# Patient Record
Sex: MALE | Race: BLACK OR AFRICAN AMERICAN | Employment: FULL TIME | URBAN - METROPOLITAN AREA
[De-identification: names, ages, dates, MRNs, and addresses within clinical notes are randomized per-mention and may not be internally consistent; named-entity substitution may affect disease eponyms.]

---

## 2017-01-03 ENCOUNTER — OFFICE VISIT (OUTPATIENT)
Dept: INTERNAL MEDICINE CLINIC | Age: 57
End: 2017-01-03

## 2017-01-03 VITALS
SYSTOLIC BLOOD PRESSURE: 142 MMHG | WEIGHT: 281 LBS | HEIGHT: 67 IN | DIASTOLIC BLOOD PRESSURE: 92 MMHG | HEART RATE: 64 BPM | BODY MASS INDEX: 44.1 KG/M2

## 2017-01-03 DIAGNOSIS — E11.9 CONTROLLED TYPE 2 DIABETES MELLITUS WITHOUT COMPLICATION, WITHOUT LONG-TERM CURRENT USE OF INSULIN (HCC): ICD-10-CM

## 2017-01-03 DIAGNOSIS — Z23 NEED FOR PNEUMOCOCCAL VACCINATION: Primary | ICD-10-CM

## 2017-01-03 DIAGNOSIS — Z12.5 PROSTATE CANCER SCREENING: ICD-10-CM

## 2017-01-03 LAB
A/G RATIO: 1.5 (ref 1.1–2.2)
ALBUMIN SERPL-MCNC: 4.3 G/DL (ref 3.4–5)
ALP BLD-CCNC: 73 U/L (ref 40–129)
ALT SERPL-CCNC: 29 U/L (ref 10–40)
ANION GAP SERPL CALCULATED.3IONS-SCNC: 15 MMOL/L (ref 3–16)
AST SERPL-CCNC: 11 U/L (ref 15–37)
BASOPHILS ABSOLUTE: 0.1 K/UL (ref 0–0.2)
BASOPHILS RELATIVE PERCENT: 0.6 %
BILIRUB SERPL-MCNC: 0.3 MG/DL (ref 0–1)
BUN BLDV-MCNC: 22 MG/DL (ref 7–20)
CALCIUM SERPL-MCNC: 9.7 MG/DL (ref 8.3–10.6)
CHLORIDE BLD-SCNC: 98 MMOL/L (ref 99–110)
CO2: 25 MMOL/L (ref 21–32)
CREAT SERPL-MCNC: 1.1 MG/DL (ref 0.9–1.3)
EOSINOPHILS ABSOLUTE: 0 K/UL (ref 0–0.6)
EOSINOPHILS RELATIVE PERCENT: 0.3 %
GFR AFRICAN AMERICAN: >60
GFR NON-AFRICAN AMERICAN: >60
GLOBULIN: 2.9 G/DL
GLUCOSE BLD-MCNC: 238 MG/DL (ref 70–99)
HCT VFR BLD CALC: 42.3 % (ref 40.5–52.5)
HEMOGLOBIN: 13.8 G/DL (ref 13.5–17.5)
LYMPHOCYTES ABSOLUTE: 2.1 K/UL (ref 1–5.1)
LYMPHOCYTES RELATIVE PERCENT: 17.9 %
MCH RBC QN AUTO: 29.2 PG (ref 26–34)
MCHC RBC AUTO-ENTMCNC: 32.7 G/DL (ref 31–36)
MCV RBC AUTO: 89.4 FL (ref 80–100)
MONOCYTES ABSOLUTE: 1 K/UL (ref 0–1.3)
MONOCYTES RELATIVE PERCENT: 8 %
NEUTROPHILS ABSOLUTE: 8.7 K/UL (ref 1.7–7.7)
NEUTROPHILS RELATIVE PERCENT: 73.2 %
PDW BLD-RTO: 14.2 % (ref 12.4–15.4)
PLATELET # BLD: 245 K/UL (ref 135–450)
PMV BLD AUTO: 10 FL (ref 5–10.5)
POTASSIUM SERPL-SCNC: 4.6 MMOL/L (ref 3.5–5.1)
PROSTATE SPECIFIC ANTIGEN: 0.92 NG/ML (ref 0–4)
RBC # BLD: 4.73 M/UL (ref 4.2–5.9)
SODIUM BLD-SCNC: 138 MMOL/L (ref 136–145)
T4 FREE: 1 NG/DL (ref 0.9–1.8)
TOTAL PROTEIN: 7.2 G/DL (ref 6.4–8.2)
TSH SERPL DL<=0.05 MIU/L-ACNC: 1.34 UIU/ML (ref 0.27–4.2)
WBC # BLD: 11.9 K/UL (ref 4–11)

## 2017-01-03 PROCEDURE — 90732 PPSV23 VACC 2 YRS+ SUBQ/IM: CPT | Performed by: INTERNAL MEDICINE

## 2017-01-03 PROCEDURE — 90471 IMMUNIZATION ADMIN: CPT | Performed by: INTERNAL MEDICINE

## 2017-01-03 PROCEDURE — 99205 OFFICE O/P NEW HI 60 MIN: CPT | Performed by: INTERNAL MEDICINE

## 2017-01-03 RX ORDER — SITAGLIPTIN 100 MG/1
TABLET, FILM COATED ORAL
Refills: 5 | COMMUNITY
Start: 2016-12-08 | End: 2017-02-09 | Stop reason: SDUPTHER

## 2017-01-03 ASSESSMENT — ENCOUNTER SYMPTOMS
CONSTIPATION: 0
TROUBLE SWALLOWING: 0
SORE THROAT: 0
VOMITING: 0
BLOOD IN STOOL: 0
ABDOMINAL PAIN: 0
DIARRHEA: 0
CHEST TIGHTNESS: 0
ANAL BLEEDING: 0
RHINORRHEA: 0
SINUS PRESSURE: 0
COUGH: 0
WHEEZING: 0
STRIDOR: 0
EYE DISCHARGE: 0
SHORTNESS OF BREATH: 0
NAUSEA: 0
VOICE CHANGE: 0
CHOKING: 0

## 2017-01-04 LAB
ESTIMATED AVERAGE GLUCOSE: 182.9 MG/DL
HBA1C MFR BLD: 8 %

## 2017-02-09 RX ORDER — SITAGLIPTIN 100 MG/1
TABLET, FILM COATED ORAL
Qty: 30 TABLET | Refills: 5 | Status: SHIPPED | OUTPATIENT
Start: 2017-02-09 | End: 2017-02-09 | Stop reason: SDUPTHER

## 2017-04-04 ENCOUNTER — OFFICE VISIT (OUTPATIENT)
Dept: INTERNAL MEDICINE CLINIC | Age: 57
End: 2017-04-04

## 2017-04-04 VITALS
SYSTOLIC BLOOD PRESSURE: 138 MMHG | HEART RATE: 64 BPM | HEIGHT: 67 IN | BODY MASS INDEX: 44.26 KG/M2 | DIASTOLIC BLOOD PRESSURE: 84 MMHG | WEIGHT: 282 LBS

## 2017-04-04 DIAGNOSIS — G47.33 OBSTRUCTIVE SLEEP APNEA SYNDROME: Chronic | ICD-10-CM

## 2017-04-04 DIAGNOSIS — E66.01 MORBID OBESITY DUE TO EXCESS CALORIES (HCC): Chronic | ICD-10-CM

## 2017-04-04 DIAGNOSIS — I10 DIABETES MELLITUS WITH COINCIDENT HYPERTENSION (HCC): Chronic | ICD-10-CM

## 2017-04-04 DIAGNOSIS — Z00.00 ROUTINE ADULT HEALTH MAINTENANCE: ICD-10-CM

## 2017-04-04 DIAGNOSIS — Z00.00 ROUTINE ADULT HEALTH MAINTENANCE: Primary | ICD-10-CM

## 2017-04-04 DIAGNOSIS — E11.9 DIABETES MELLITUS WITH COINCIDENT HYPERTENSION (HCC): Chronic | ICD-10-CM

## 2017-04-04 PROCEDURE — 99214 OFFICE O/P EST MOD 30 MIN: CPT | Performed by: INTERNAL MEDICINE

## 2017-04-04 RX ORDER — ATORVASTATIN CALCIUM 20 MG/1
20 TABLET, FILM COATED ORAL DAILY
Qty: 30 TABLET | Refills: 2 | Status: SHIPPED | OUTPATIENT
Start: 2017-04-04 | End: 2017-04-04 | Stop reason: SDUPTHER

## 2017-04-05 LAB
CHOLESTEROL, TOTAL: 149 MG/DL (ref 0–199)
HDLC SERPL-MCNC: 50 MG/DL (ref 40–60)
HEPATITIS C ANTIBODY INTERPRETATION: NORMAL
HIV-1 AND HIV-2 ANTIBODIES: NORMAL
LDL CHOLESTEROL CALCULATED: 65 MG/DL
TRIGL SERPL-MCNC: 169 MG/DL (ref 0–150)
VLDLC SERPL CALC-MCNC: 34 MG/DL

## 2017-06-15 ENCOUNTER — TELEPHONE (OUTPATIENT)
Dept: SLEEP MEDICINE | Age: 57
End: 2017-06-15

## 2017-07-06 ENCOUNTER — OFFICE VISIT (OUTPATIENT)
Dept: INTERNAL MEDICINE CLINIC | Age: 57
End: 2017-07-06

## 2017-07-06 VITALS
WEIGHT: 273 LBS | HEART RATE: 76 BPM | BODY MASS INDEX: 42.85 KG/M2 | SYSTOLIC BLOOD PRESSURE: 138 MMHG | DIASTOLIC BLOOD PRESSURE: 86 MMHG | HEIGHT: 67 IN

## 2017-07-06 DIAGNOSIS — I10 DIABETES MELLITUS WITH COINCIDENT HYPERTENSION (HCC): Primary | Chronic | ICD-10-CM

## 2017-07-06 DIAGNOSIS — E11.9 DIABETES MELLITUS WITH COINCIDENT HYPERTENSION (HCC): Primary | Chronic | ICD-10-CM

## 2017-07-06 DIAGNOSIS — G47.33 OBSTRUCTIVE SLEEP APNEA SYNDROME: Chronic | ICD-10-CM

## 2017-07-06 DIAGNOSIS — E66.01 MORBID OBESITY DUE TO EXCESS CALORIES (HCC): Chronic | ICD-10-CM

## 2017-07-06 DIAGNOSIS — B35.1 ONYCHOMYCOSIS: ICD-10-CM

## 2017-07-06 LAB
CREATININE URINE: 244.7 MG/DL (ref 39–259)
MICROALBUMIN UR-MCNC: 22.4 MG/DL
MICROALBUMIN/CREAT UR-RTO: 91.5 MG/G (ref 0–30)

## 2017-07-06 PROCEDURE — 99214 OFFICE O/P EST MOD 30 MIN: CPT | Performed by: INTERNAL MEDICINE

## 2017-07-06 RX ORDER — TERBINAFINE HYDROCHLORIDE 250 MG/1
250 TABLET ORAL DAILY
Qty: 30 TABLET | Refills: 5 | Status: SHIPPED | OUTPATIENT
Start: 2017-07-06 | End: 2017-12-20 | Stop reason: SDUPTHER

## 2017-07-07 LAB
ESTIMATED AVERAGE GLUCOSE: 171.4 MG/DL
HBA1C MFR BLD: 7.6 %

## 2017-09-12 ENCOUNTER — OFFICE VISIT (OUTPATIENT)
Dept: ORTHOPEDIC SURGERY | Age: 57
End: 2017-09-12

## 2017-09-12 VITALS — RESPIRATION RATE: 16 BRPM | HEIGHT: 67 IN | BODY MASS INDEX: 41.59 KG/M2 | WEIGHT: 265 LBS

## 2017-09-12 DIAGNOSIS — M65.311 TRIGGER FINGER OF RIGHT THUMB: Primary | ICD-10-CM

## 2017-09-12 PROCEDURE — 20550 NJX 1 TENDON SHEATH/LIGAMENT: CPT | Performed by: ORTHOPAEDIC SURGERY

## 2017-09-12 PROCEDURE — 99213 OFFICE O/P EST LOW 20 MIN: CPT | Performed by: ORTHOPAEDIC SURGERY

## 2017-12-05 ENCOUNTER — OFFICE VISIT (OUTPATIENT)
Dept: ORTHOPEDIC SURGERY | Age: 57
End: 2017-12-05

## 2017-12-05 VITALS — RESPIRATION RATE: 16 BRPM | BODY MASS INDEX: 42.38 KG/M2 | HEIGHT: 67 IN | WEIGHT: 270 LBS

## 2017-12-05 DIAGNOSIS — M79.645 FINGER PAIN, LEFT: ICD-10-CM

## 2017-12-05 DIAGNOSIS — M65.341 TRIGGER RING FINGER OF RIGHT HAND: Primary | ICD-10-CM

## 2017-12-05 PROCEDURE — 73140 X-RAY EXAM OF FINGER(S): CPT | Performed by: ORTHOPAEDIC SURGERY

## 2017-12-05 PROCEDURE — 99213 OFFICE O/P EST LOW 20 MIN: CPT | Performed by: ORTHOPAEDIC SURGERY

## 2017-12-05 NOTE — PROGRESS NOTES
scheduled and the patient is asked to call me if there are any additional questions. The patient understands that the surgery will be done by Dr. Pierce Wong. The patient is instructed to stop taking aspirin 7-10 days before surgery. They will check with their medical doctor(s), if needed, to ask permission to do so.

## 2017-12-05 NOTE — LETTER
CONSENT TO OPERATION  AND/OR OTHER PROCEDURE(S)          PATIENT : Jaylene Quezada   YOB: 1960      DATE : 12/5/17          1. I request and consent that Dr. Nora Limon,  and/or his associates or assistants perform an operation and/or procedures on the above patient at  [place] [address], to treat the condition(s) which appear indicated by the diagnostic studies already performed. I have been told that in general terms the nature, purpose and reasonable expectations of the operation and/or procedure(s) are:     Release Right Ring Trigger Finger      2. It has been explained to me by the informing physician that during the course of the operation and/or procedure(s) unforeseen conditions may be revealed that necessitate an extension of the original operation and/or procedure(s) or different operation and/or procedures than those set forth in Paragraph 1. I therefore authorize and request that my physician and/or his associates or assistants perform such operations and/or procedures as are necessary and desirable in the exercise of professional judgment. The authority granted under this Paragraph 2 shall extend to all conditions that require treatment and are known to my physician at the time the operation is commenced. 3. I have been made aware by the informing physician of certain risks and consequences that are associated with the operation and/or procedure(s) described in Paragraph 1, the reasonable alternative methods or treatment, the possible consequences, the possibility that the operation and/or procedure(s) may be unsuccessful and the possibility of complications. I understand the reasonably known risks to be:      ? Bleeding  ? Infection  ? Poor Healing  ? Possible Damage to Nerve, Vessel, Tendon/Muscle or Bone  ? Need for further Treatment/Surgery  ? Stiffness  ? Pain  ? Residual or Recurrent Symptoms  ? Anesthetic and/or Medical Risks  ?

## 2017-12-11 ENCOUNTER — TELEPHONE (OUTPATIENT)
Dept: INTERNAL MEDICINE CLINIC | Age: 57
End: 2017-12-11

## 2017-12-11 NOTE — TELEPHONE ENCOUNTER
Pt calling having hand surgery 12/26 ---where can we work him in for a preop? Please let me know. Thanks.

## 2017-12-19 ENCOUNTER — OFFICE VISIT (OUTPATIENT)
Dept: INTERNAL MEDICINE CLINIC | Age: 57
End: 2017-12-19

## 2017-12-19 VITALS
HEIGHT: 67 IN | SYSTOLIC BLOOD PRESSURE: 138 MMHG | DIASTOLIC BLOOD PRESSURE: 86 MMHG | WEIGHT: 269.2 LBS | BODY MASS INDEX: 42.25 KG/M2 | HEART RATE: 72 BPM

## 2017-12-19 DIAGNOSIS — I10 DIABETES MELLITUS WITH COINCIDENT HYPERTENSION (HCC): Chronic | ICD-10-CM

## 2017-12-19 DIAGNOSIS — E66.01 MORBID OBESITY DUE TO EXCESS CALORIES (HCC): Chronic | ICD-10-CM

## 2017-12-19 DIAGNOSIS — M65.341 TRIGGER RING FINGER OF RIGHT HAND: ICD-10-CM

## 2017-12-19 DIAGNOSIS — M65.311 TRIGGER FINGER OF RIGHT THUMB: ICD-10-CM

## 2017-12-19 DIAGNOSIS — E11.9 CONTROLLED TYPE 2 DIABETES MELLITUS WITHOUT COMPLICATION, WITHOUT LONG-TERM CURRENT USE OF INSULIN (HCC): ICD-10-CM

## 2017-12-19 DIAGNOSIS — G47.33 OBSTRUCTIVE SLEEP APNEA SYNDROME: Chronic | ICD-10-CM

## 2017-12-19 DIAGNOSIS — Z01.818 PRE-OP EXAMINATION: Primary | ICD-10-CM

## 2017-12-19 DIAGNOSIS — E11.9 DIABETES MELLITUS WITH COINCIDENT HYPERTENSION (HCC): Chronic | ICD-10-CM

## 2017-12-19 PROCEDURE — 99214 OFFICE O/P EST MOD 30 MIN: CPT | Performed by: INTERNAL MEDICINE

## 2017-12-19 NOTE — PROGRESS NOTES
Ezra Cerna is a 59 y.o.  male who comes for a preoperative exam.  He  is referred by Dr. Bert Garcia for perioperative risk determination for upcoming surgery for right 4th finger trigger finger. Past Medical History:   Diagnosis Date    Allergic rhinitis     Carpal tunnel syndrome 12/24/2014    Cervical disc disease     CADY    Colon polyps 2016    Diabetes mellitus ([address]     Dysfunction of eustachian tube 2/27/2012    Hypertension     Morbid obesity due to excess calories ([address] 4/4/2017    Simple chronic serous otitis media 2/27/2012    Trigger finger, acquired 6/11/2014    Uncontrolled type 2 diabetes mellitus without complication, without long-term current use of insulin ([address] 4/4/2017     Past Surgical History:   Procedure Laterality Date    KNEE SURGERY      NASAL SEPTUM SURGERY  2005    TYMPANOSTOMY TUBE PLACEMENT       No family history on file. Social History     Social History    Marital status:      Spouse name: N/A    Number of children: N/A    Years of education: N/A     Occupational History    Not on file.      Social History Main Topics    Smoking status: Never Smoker    Smokeless tobacco: Never Used    Alcohol use No    Drug use: No    Sexual activity: Not on file     Other Topics Concern    Not on file     Social History Narrative    No narrative on file       Current Outpatient Prescriptions:     amLODIPine (NORVASC) 5 MG tablet, TAKE 1 TABLET BY MOUTH EVERY DAY, Disp: 90 tablet, Rfl: 1    losartan (COZAAR) 100 MG tablet, TAKE 1 TABLET BY MOUTH DAILY, Disp: 90 tablet, Rfl: 1    TRULICITY 4.29 WT/1.7XM SOPN, INJECT 0.75 MG INTO THE SKIN ONCE A WEEK, Disp: 4 Pen, Rfl: 3    ONETOUCH DELICA LANCETS 27T MISC, 1 Device by Other route 3 times daily, Disp: 100 each, Rfl: 5    terbinafine (LAMISIL) 250 MG tablet, Take 1 tablet by mouth daily, Disp: 30 tablet, Rfl: 5    atorvastatin (LIPITOR) 20 MG tablet, TAKE 1 TABLET BY MOUTH DAILY, Disp: 90 tablet, Rfl: 1   glucose blood VI test strips (ONE TOUCH ULTRA TEST) strip, 1 each by In Vitro route daily As needed. , Disp: 100 each, Rfl: 3    metFORMIN (GLUCOPHAGE) 1000 MG tablet, Take 1 tablet by mouth 2 times daily (with meals), Disp: 180 tablet, Rfl: 3    aspirin 81 MG EC tablet, Take 81 mg by mouth daily. , Disp: , Rfl:   Allergies   Allergen Reactions    Pcn [Penicillins]      ROS  Physical Exam  Assessment/Plan:  Carmella Michael was seen today for pre-op exam.    Diagnoses and all orders for this visit:    Pre-op examination    Obstructive sleep apnea syndrome    Trigger ring finger of right hand    Trigger finger of right thumb    Morbid obesity due to excess calories ([address]    Diabetes mellitus with coincident hypertension ([address]    Controlled type 2 diabetes mellitus without complication, without long-term current use of insulin ([address]    Perioperative risk related to the patient's upcoming surgery is considered low. His risk is primarily related to the patient's Morbid obesity, as obstructive sleep apnea. He is cleared for surgery. Pre-op exam was completed on December 19, 2017 at 1000.     Lizeth Small MD

## 2017-12-20 ENCOUNTER — PAT TELEPHONE (OUTPATIENT)
Dept: PREADMISSION TESTING | Age: 57
End: 2017-12-20

## 2017-12-20 VITALS — BODY MASS INDEX: 42.22 KG/M2 | WEIGHT: 269 LBS | HEIGHT: 67 IN

## 2017-12-20 RX ORDER — TERBINAFINE HYDROCHLORIDE 250 MG/1
250 TABLET ORAL DAILY
COMMUNITY
End: 2018-07-13 | Stop reason: ALTCHOICE

## 2017-12-20 NOTE — PRE-PROCEDURE INSTRUCTIONS
[facility] time_1015___________        Surgery time_1145___________    Take the following medications with a sip of water:  Losartan, Amlodipine    Do not eat or drink anything after 12:00 midnight prior to your surgery. This includes water chewing gum, mints and ice chips. You may brush your teeth and gargle the morning of your surgery, but do not swallow the water     Please see your family doctor/pediatrician for a history and physical and/or concerning medications. Bring any test results/reports from your physicians office. If you are under the care of a heart doctor or specialist doctor, please be aware that you may be asked to them for clearance    You may be asked to stop blood thinners such as Coumadin, Plavix, Fragmin, Lovenox, etc., or any anti-inflammatories such as:  Aspirin, Ibuprofen, Advil, Naproxen prior to your surgery. We also ask that you stop any OTC medications such as fish oil, vitamin E, glucosamine, garlic, Multivitamins, COQ 10, etc.    We ask that you do not smoke 24 hours prior to surgery  We ask that you do not  drink any alcoholic beverages 24 hours prior to surgery     You must make arrangements for a responsible adult to take you home after your surgery. For your safety you will not be allowed to leave alone or drive yourself home. Your surgery will be cancelled if you do not have a ride home. Also for your safety, it is strongly suggested that someone stay with you the first 24 hours after your surgery. A parent or legal guardian must accompany a child scheduled for surgery and plan to stay at the hospital until the child is discharged. Please do not bring other children with you. For your comfort, please wear simple loose fitting clothing to the hospital.  Please do not bring valuables.     Do not wear any make-up or nail polish on your fingers or toes      For your safety, please do not wear any jewelry or

## 2017-12-20 NOTE — PROGRESS NOTES
C-Difficile admission screening and protocol:     * Admitted with diarrhea? NO     *Prior history of C-Diff. In last 3 months? NO     *Antibiotic use in the past 6-8 weeks? NO     *Prior hospitalization or nursing home in the last month?   NO

## 2017-12-21 DIAGNOSIS — I10 DIABETES MELLITUS WITH COINCIDENT HYPERTENSION (HCC): Chronic | ICD-10-CM

## 2017-12-21 DIAGNOSIS — E11.9 DIABETES MELLITUS WITH COINCIDENT HYPERTENSION (HCC): Chronic | ICD-10-CM

## 2017-12-21 RX ORDER — ATORVASTATIN CALCIUM 20 MG/1
TABLET, FILM COATED ORAL
Qty: 90 TABLET | Refills: 0 | Status: SHIPPED | OUTPATIENT
Start: 2017-12-21 | End: 2018-03-18 | Stop reason: SDUPTHER

## 2017-12-26 ENCOUNTER — HOSPITAL ENCOUNTER (OUTPATIENT)
Dept: SURGERY | Age: 57
Discharge: HOME | End: 2017-12-26
Attending: ORTHOPAEDIC SURGERY | Admitting: ORTHOPAEDIC SURGERY

## 2017-12-26 VITALS
DIASTOLIC BLOOD PRESSURE: 106 MMHG | HEART RATE: 67 BPM | BODY MASS INDEX: 42.72 KG/M2 | WEIGHT: 272.16 LBS | SYSTOLIC BLOOD PRESSURE: 179 MMHG | TEMPERATURE: 97.5 F | HEIGHT: 67 IN | OXYGEN SATURATION: 98 % | RESPIRATION RATE: 20 BRPM

## 2017-12-26 LAB
GLUCOSE BLD-MCNC: 111 MG/DL (ref 70–99)
GLUCOSE BLD-MCNC: 98 MG/DL (ref 70–99)
PERFORMED ON: ABNORMAL
PERFORMED ON: NORMAL

## 2017-12-26 RX ORDER — FENTANYL CITRATE 50 UG/ML
25 INJECTION, SOLUTION INTRAMUSCULAR; INTRAVENOUS EVERY 5 MIN PRN
Status: DISCONTINUED | OUTPATIENT
Start: 2017-12-26 | End: 2017-12-27 | Stop reason: HOSPADM

## 2017-12-26 RX ORDER — ONDANSETRON 2 MG/ML
4 INJECTION INTRAMUSCULAR; INTRAVENOUS
Status: ACTIVE | OUTPATIENT
Start: 2017-12-26 | End: 2017-12-26

## 2017-12-26 RX ORDER — SODIUM CHLORIDE 0.9 % (FLUSH) 0.9 %
10 SYRINGE (ML) INJECTION PRN
Status: DISCONTINUED | OUTPATIENT
Start: 2017-12-26 | End: 2017-12-27 | Stop reason: HOSPADM

## 2017-12-26 RX ORDER — HYDROMORPHONE HCL 110MG/55ML
0.5 PATIENT CONTROLLED ANALGESIA SYRINGE INTRAVENOUS EVERY 5 MIN PRN
Status: DISCONTINUED | OUTPATIENT
Start: 2017-12-26 | End: 2017-12-27 | Stop reason: HOSPADM

## 2017-12-26 RX ORDER — OXYCODONE HYDROCHLORIDE AND ACETAMINOPHEN 5; 325 MG/1; MG/1
1 TABLET ORAL PRN
Status: ACTIVE | OUTPATIENT
Start: 2017-12-26 | End: 2017-12-26

## 2017-12-26 RX ORDER — OXYCODONE HYDROCHLORIDE AND ACETAMINOPHEN 5; 325 MG/1; MG/1
2 TABLET ORAL PRN
Status: ACTIVE | OUTPATIENT
Start: 2017-12-26 | End: 2017-12-26

## 2017-12-26 RX ORDER — SODIUM CHLORIDE 0.9 % (FLUSH) 0.9 %
10 SYRINGE (ML) INJECTION EVERY 12 HOURS SCHEDULED
Status: DISCONTINUED | OUTPATIENT
Start: 2017-12-26 | End: 2017-12-27 | Stop reason: HOSPADM

## 2017-12-26 RX ORDER — SODIUM CHLORIDE 9 MG/ML
INJECTION, SOLUTION INTRAVENOUS CONTINUOUS
Status: DISCONTINUED | OUTPATIENT
Start: 2017-12-26 | End: 2017-12-27 | Stop reason: HOSPADM

## 2017-12-26 RX ADMIN — SODIUM CHLORIDE: 9 INJECTION, SOLUTION INTRAVENOUS at 10:49

## 2017-12-26 ASSESSMENT — ENCOUNTER SYMPTOMS: SHORTNESS OF BREATH: 0

## 2017-12-26 ASSESSMENT — PAIN - FUNCTIONAL ASSESSMENT: PAIN_FUNCTIONAL_ASSESSMENT: 0-10

## 2017-12-26 ASSESSMENT — PAIN SCALES - GENERAL: PAINLEVEL_OUTOF10: 2

## 2017-12-26 NOTE — ANESTHESIA PRE-OP
5 MG tablet TAKE 1 TABLET BY MOUTH EVERY DAY 90 tablet 1    losartan (COZAAR) 100 MG tablet TAKE 1 TABLET BY MOUTH DAILY 90 tablet 1    ONETOUCH DELICA LANCETS 84R MISC 1 Device by Other route 3 times daily 100 each 5    glucose blood VI test strips (ONE TOUCH ULTRA TEST) strip 1 each by In Vitro route daily As needed. 100 each 3    metFORMIN (GLUCOPHAGE) 1000 MG tablet Take 1 tablet by mouth 2 times daily (with meals) 180 tablet 3    aspirin 81 MG EC tablet Take 81 mg by mouth daily. No current facility-administered medications on file prior to encounter. Current Outpatient Prescriptions   Medication Sig Dispense Refill    atorvastatin (LIPITOR) 20 MG tablet TAKE 1 TABLET BY MOUTH DAILY 90 tablet 0    TRULICITY 4.53 WQ/9.9UP SOPN INJECT 0.75MG INTO THE SKIN ONCE A WEEK 2 mL 0    terbinafine (LAMISIL) 250 MG tablet Take 250 mg by mouth daily      amLODIPine (NORVASC) 5 MG tablet TAKE 1 TABLET BY MOUTH EVERY DAY 90 tablet 1    losartan (COZAAR) 100 MG tablet TAKE 1 TABLET BY MOUTH DAILY 90 tablet 1    ONETOUCH DELICA LANCETS 42J MISC 1 Device by Other route 3 times daily 100 each 5    glucose blood VI test strips (ONE TOUCH ULTRA TEST) strip 1 each by In Vitro route daily As needed. 100 each 3    metFORMIN (GLUCOPHAGE) 1000 MG tablet Take 1 tablet by mouth 2 times daily (with meals) 180 tablet 3    aspirin 81 MG EC tablet Take 81 mg by mouth daily. Current Facility-Administered Medications   Medication Dose Route Frequency Provider Last Rate Last Dose    0.9 % sodium chloride infusion   Intravenous Continuous Christie Torrez MD        sodium chloride flush 0.9 % injection 10 mL  10 mL Intravenous 2 times per day Christie Torrez MD        sodium chloride flush 0.9 % injection 10 mL  10 mL Intravenous PRN Christie Torrez MD        famotidine (PEPCID) injection 20 mg  20 mg Intravenous Once Christie Torrez MD         Vital Signs (Current) There were no vitals filed for this visit.   Vital

## 2017-12-26 NOTE — PROGRESS NOTES
Pt awake. States \"discomfort\" in hand 2/10. Moves fingers to right hand well.   To phase 2 care per stretcher with RN assist.

## 2017-12-26 NOTE — PROGRESS NOTES
To pacu from OR. PT asleep with oral airway in place. Dressing to right hand dry and intact. Fingers to right hand warm with brisk cap refill. IV infusing. Monitor in sinus rhythm.

## 2017-12-26 NOTE — H&P
Pre-operative Update of H&P:    I  have seen & examined Mr. Kala Crow related solely to his hand and upper extremity conditions, prior to the scheduled procedure on the date of his surgery. The indications for the planned surgical procedure & and his upper-extremity conditionare unchanged. Please see the Anesthesia Pre-Op Note from date of surgery for Mr. Roxanna Ranger Sanon's systemic evaluation.

## 2018-01-05 ENCOUNTER — OFFICE VISIT (OUTPATIENT)
Dept: ORTHOPEDIC SURGERY | Age: 58
End: 2018-01-05

## 2018-01-05 VITALS — HEIGHT: 67 IN | BODY MASS INDEX: 42.38 KG/M2 | RESPIRATION RATE: 17 BRPM | WEIGHT: 270 LBS

## 2018-01-05 DIAGNOSIS — M65.341 TRIGGER RING FINGER OF RIGHT HAND: Primary | ICD-10-CM

## 2018-01-05 PROCEDURE — 99024 POSTOP FOLLOW-UP VISIT: CPT | Performed by: PHYSICIAN ASSISTANT

## 2018-01-05 NOTE — PATIENT INSTRUCTIONS
Postoperative Instructions After Trigger Finger Release    Dr. Morgan Valderrama        1. After bandages are removed one week from surgery, you may chose to wear a small bandage over the incision if you wish, though you do not need to. 2. Keep incision dry until sutures are removed or it has been 14 days since your surgery. Thereafter, you may wash with mild soap and water and shower normally. 3. IF YOU HAVE DISSOLVABLE SUTURES:  Once your stiches have fully disappeared, you should begin gently massaging the incision with Vitamin E (may use Vitamin E lotion or contents of Vitamin E capsule). IF YOU HAVE NON-DISSOLVABLE SUTURES (Black plastic): Keep stitches dry. Do Not apply any ointment or lotion to incision site. Schedule appointment for 14 or more days after the date of your surgery for suture removal visit. After your stitches are removed, you should begin gently massaging the incision with Vitamin E (may use Vitamin E lotion or contents of Vitamin E capsule). 4. Work hard on motion of the fingers and wrist, straightening each finger fully and bending each finger fully, bending wrist forward and bending wrist backwards. Do not be concerned if you experience discomfort. This will not damage the surgery. 5. You may begin using the hand as it feels comfortable beginning 12-14 days from the day of surgery. You may not feel entirely comfortable gripping or lifting heavy objects for several weeks. 6. You may expect to see some skin peel off around the incision. You may be left with a small area of pink baby skin. This is quite normal.    Thank you for choosing [address] [place] [address] Physicians for your Hand and Upper Extremity needs. If we can be of any further assistance to you, please do not hesitate to contact us. Office Phone Number:  [phone]  or  [phone]     Thank you for choosing [address] [place] [address] Physicians for your Hand and Upper Extremity needs.   If we can be of any further assistance to

## 2018-01-11 DIAGNOSIS — I10 DIABETES MELLITUS WITH COINCIDENT HYPERTENSION (HCC): Chronic | ICD-10-CM

## 2018-01-11 DIAGNOSIS — E11.9 DIABETES MELLITUS WITH COINCIDENT HYPERTENSION (HCC): Chronic | ICD-10-CM

## 2018-01-11 DIAGNOSIS — E66.01 MORBID OBESITY DUE TO EXCESS CALORIES (HCC): Chronic | ICD-10-CM

## 2018-01-11 RX ORDER — DULAGLUTIDE 0.75 MG/.5ML
INJECTION, SOLUTION SUBCUTANEOUS
Qty: 2 ML | Refills: 0 | Status: SHIPPED | OUTPATIENT
Start: 2018-01-11 | End: 2018-02-15 | Stop reason: SDUPTHER

## 2018-01-12 ENCOUNTER — OFFICE VISIT (OUTPATIENT)
Dept: INTERNAL MEDICINE CLINIC | Age: 58
End: 2018-01-12

## 2018-01-12 VITALS
DIASTOLIC BLOOD PRESSURE: 86 MMHG | HEIGHT: 67 IN | SYSTOLIC BLOOD PRESSURE: 136 MMHG | HEART RATE: 76 BPM | WEIGHT: 268 LBS | BODY MASS INDEX: 42.06 KG/M2

## 2018-01-12 DIAGNOSIS — E11.9 CONTROLLED TYPE 2 DIABETES MELLITUS WITHOUT COMPLICATION, WITHOUT LONG-TERM CURRENT USE OF INSULIN (HCC): ICD-10-CM

## 2018-01-12 DIAGNOSIS — I10 DIABETES MELLITUS WITH COINCIDENT HYPERTENSION (HCC): Primary | Chronic | ICD-10-CM

## 2018-01-12 DIAGNOSIS — E11.9 DIABETES MELLITUS WITH COINCIDENT HYPERTENSION (HCC): Primary | Chronic | ICD-10-CM

## 2018-01-12 DIAGNOSIS — E66.01 MORBID OBESITY DUE TO EXCESS CALORIES (HCC): Chronic | ICD-10-CM

## 2018-01-12 DIAGNOSIS — L20.82 FLEXURAL ECZEMA: ICD-10-CM

## 2018-01-12 PROBLEM — M65.341 TRIGGER RING FINGER OF RIGHT HAND: Status: RESOLVED | Noted: 2017-12-05 | Resolved: 2018-01-12

## 2018-01-12 PROBLEM — M65.311 TRIGGER FINGER OF RIGHT THUMB: Status: RESOLVED | Noted: 2017-09-12 | Resolved: 2018-01-12

## 2018-01-12 LAB
A/G RATIO: 1.9 (ref 1.1–2.2)
ALBUMIN SERPL-MCNC: 4.7 G/DL (ref 3.4–5)
ALP BLD-CCNC: 79 U/L (ref 40–129)
ALT SERPL-CCNC: 22 U/L (ref 10–40)
ANION GAP SERPL CALCULATED.3IONS-SCNC: 15 MMOL/L (ref 3–16)
AST SERPL-CCNC: 14 U/L (ref 15–37)
BASOPHILS ABSOLUTE: 0.1 K/UL (ref 0–0.2)
BASOPHILS RELATIVE PERCENT: 1 %
BILIRUB SERPL-MCNC: 0.4 MG/DL (ref 0–1)
BUN BLDV-MCNC: 15 MG/DL (ref 7–20)
CALCIUM SERPL-MCNC: 9.9 MG/DL (ref 8.3–10.6)
CHLORIDE BLD-SCNC: 104 MMOL/L (ref 99–110)
CO2: 25 MMOL/L (ref 21–32)
CREAT SERPL-MCNC: 1.1 MG/DL (ref 0.9–1.3)
EOSINOPHILS ABSOLUTE: 0.3 K/UL (ref 0–0.6)
EOSINOPHILS RELATIVE PERCENT: 4.3 %
GFR AFRICAN AMERICAN: >60
GFR NON-AFRICAN AMERICAN: >60
GLOBULIN: 2.5 G/DL
GLUCOSE BLD-MCNC: 112 MG/DL (ref 70–99)
HCT VFR BLD CALC: 41.2 % (ref 40.5–52.5)
HEMOGLOBIN: 13.6 G/DL (ref 13.5–17.5)
LYMPHOCYTES ABSOLUTE: 2.2 K/UL (ref 1–5.1)
LYMPHOCYTES RELATIVE PERCENT: 34.1 %
MCH RBC QN AUTO: 30.3 PG (ref 26–34)
MCHC RBC AUTO-ENTMCNC: 33.1 G/DL (ref 31–36)
MCV RBC AUTO: 91.5 FL (ref 80–100)
MONOCYTES ABSOLUTE: 0.6 K/UL (ref 0–1.3)
MONOCYTES RELATIVE PERCENT: 8.6 %
NEUTROPHILS ABSOLUTE: 3.4 K/UL (ref 1.7–7.7)
NEUTROPHILS RELATIVE PERCENT: 52 %
PDW BLD-RTO: 13.9 % (ref 12.4–15.4)
PLATELET # BLD: 230 K/UL (ref 135–450)
PMV BLD AUTO: 9.7 FL (ref 5–10.5)
POTASSIUM SERPL-SCNC: 4.3 MMOL/L (ref 3.5–5.1)
RBC # BLD: 4.5 M/UL (ref 4.2–5.9)
SODIUM BLD-SCNC: 144 MMOL/L (ref 136–145)
T4 FREE: 1.2 NG/DL (ref 0.9–1.8)
TOTAL PROTEIN: 7.2 G/DL (ref 6.4–8.2)
TSH SERPL DL<=0.05 MIU/L-ACNC: 2.65 UIU/ML (ref 0.27–4.2)
WBC # BLD: 6.5 K/UL (ref 4–11)

## 2018-01-12 PROCEDURE — 99214 OFFICE O/P EST MOD 30 MIN: CPT | Performed by: INTERNAL MEDICINE

## 2018-01-12 RX ORDER — TRIAMCINOLONE ACETONIDE 1 MG/G
CREAM TOPICAL
Qty: 453 G | Refills: 1 | Status: SHIPPED | OUTPATIENT
Start: 2018-01-12

## 2018-01-12 ASSESSMENT — PATIENT HEALTH QUESTIONNAIRE - PHQ9
2. FEELING DOWN, DEPRESSED OR HOPELESS: 0
1. LITTLE INTEREST OR PLEASURE IN DOING THINGS: 0
SUM OF ALL RESPONSES TO PHQ9 QUESTIONS 1 & 2: 0
SUM OF ALL RESPONSES TO PHQ QUESTIONS 1-9: 0

## 2018-01-12 NOTE — PROGRESS NOTES
SUBJECTIVE:  Patient ID: Petrona Mattson is a 59 y.o. y.o. male     HPI    Petrona Mattson returns for follow up of hypertension. Patient has been taking His medications as prescribed. Patient's blood pressure is  controlled. Side effects related to taking the medications include no medication side effects noted    Patient returns to the office for follow up of his diabetes. His last hemoglobin A1c was 6.8. He is compliant with his medications but admits to a lot of dietary noncompliance. Patient has no symptoms related to his condition such as blurred vision, slurred speech, chest pain or shortness of breath. PHQ Scores 1/12/2018   PHQ2 Score 0   PHQ9 Score 0     Interpretation of Total Score Depression Severity: 1-4 = Minimal depression, 5-9 = Mild depression, 10-14 = Moderate depression, 15-19 = Moderately severe depression, 20-27 = Severe depression    Review of Systems    OBJECTIVE:    /86   Pulse 76   Ht 5' 7\" (1.702 m)   Wt 268 lb (121.6 kg)   BMI 41.97 kg/m²    Physical Exam   Constitutional: He is oriented to person, place, and time. He appears well-developed and well-nourished. No distress. HENT:   Head: Normocephalic and atraumatic. Pulmonary/Chest: Effort normal. No respiratory distress. Neurological: He is alert and oriented to person, place, and time. No cranial nerve deficit. Skin: Skin is warm and dry. He is not diaphoretic. Psychiatric: He has a normal mood and affect. His behavior is normal. Judgment and thought content normal.   Vitals reviewed.        Current Outpatient Prescriptions:     TRULICITY 8.76 TS/8.2RW SOPN, INJECT 0.75MG INTO THE SKIN ONCE A WEEK, Disp: 2 mL, Rfl: 0    atorvastatin (LIPITOR) 20 MG tablet, TAKE 1 TABLET BY MOUTH DAILY, Disp: 90 tablet, Rfl: 0    terbinafine (LAMISIL) 250 MG tablet, Take 250 mg by mouth daily, Disp: , Rfl:     amLODIPine (NORVASC) 5 MG tablet, TAKE 1 TABLET BY MOUTH EVERY DAY, Disp: 90 tablet, Rfl: 1    losartan (COZAAR) 100

## 2018-01-13 LAB
ESTIMATED AVERAGE GLUCOSE: 162.8 MG/DL
HBA1C MFR BLD: 7.3 %

## 2018-01-29 ENCOUNTER — TELEPHONE (OUTPATIENT)
Dept: ORTHOPEDIC SURGERY | Age: 58
End: 2018-01-29

## 2018-01-29 NOTE — TELEPHONE ENCOUNTER
Patient had sx on right ring finger 12/26/17    He states that he the finger and hand is still very swollen, is this normal?    Also there is a hardness where the wound is at- he is concerned and wants to know what he should do?   Pls call patient at [phone]

## 2018-01-31 ENCOUNTER — OFFICE VISIT (OUTPATIENT)
Dept: ORTHOPEDIC SURGERY | Age: 58
End: 2018-01-31

## 2018-01-31 VITALS — BODY MASS INDEX: 42.69 KG/M2 | HEIGHT: 67 IN | WEIGHT: 272 LBS

## 2018-01-31 DIAGNOSIS — M65.341 TRIGGER RING FINGER OF RIGHT HAND: Primary | ICD-10-CM

## 2018-01-31 PROCEDURE — 99024 POSTOP FOLLOW-UP VISIT: CPT | Performed by: PHYSICIAN ASSISTANT

## 2018-07-10 ENCOUNTER — OFFICE VISIT (OUTPATIENT)
Dept: ORTHOPEDIC SURGERY | Age: 58
End: 2018-07-10

## 2018-07-10 VITALS
DIASTOLIC BLOOD PRESSURE: 88 MMHG | RESPIRATION RATE: 16 BRPM | SYSTOLIC BLOOD PRESSURE: 134 MMHG | HEIGHT: 67 IN | BODY MASS INDEX: 40.81 KG/M2 | WEIGHT: 260 LBS

## 2018-07-10 DIAGNOSIS — M65.341 TRIGGER RING FINGER OF RIGHT HAND: Primary | ICD-10-CM

## 2018-07-10 PROCEDURE — 99213 OFFICE O/P EST LOW 20 MIN: CPT | Performed by: ORTHOPAEDIC SURGERY

## 2018-07-10 PROCEDURE — 20550 NJX 1 TENDON SHEATH/LIGAMENT: CPT | Performed by: ORTHOPAEDIC SURGERY

## 2018-07-10 NOTE — PROGRESS NOTES
I last examined this patient 6 months ago for recurrent right ring finger, for which he has had surgery. He obtained prompt and complete relief of all symptoms. Unfortunately, the condition has recurred in the right thumb and the patient returns to the office requesting additional treatment. He complains of pain, swelling, catching and stiffness of the digit for the past 3 weeks. Symptoms have become worse recently. The patient's social history, past medical history, family history, medications, allergies and review of systems have been reviewed, and dated and are recorded in the chart. On examination there is mild soft tissue swelling of the digit. There is no deformity. There is tenderness, thickening and nodularity at the base of the flexor tendon sheath. Range of motion is slightly limited in flexion and extension. The digit sticks in flexion and pops into extension, accompanied by some pain. Skin is intact without lesions. Distal circulation and sensation are intact. Muscle strength and coordination are normal.  Reflexes and present bilaterally. Joints are stable. There are no subcutaneous nodules or enlarged epitrochlear lymph nodes. Impression: Recurrent right thumb trigger digit. The nature of this medical problem is fully discussed with the patient, including all treatment options. All questions are answered. The right  hand is prepared with Betadine and alcohol and the flexor tendon sheath of the right thumb is injected with 1/2 milliliter of 1% lidocaine and 20 mg.of triamcinalone, with good filling. The patient is advised regarding the expected response and possible reactions from the injection. The patient is asked to call me if full, painless function has not returned within 4 weeks. The possibility of recurrence of the problem is discussed. The patient understands that this is the 3rd and last steroid injection for this digit.   If the problem recurs in this digit, they are

## 2018-07-13 ENCOUNTER — OFFICE VISIT (OUTPATIENT)
Dept: INTERNAL MEDICINE CLINIC | Age: 58
End: 2018-07-13

## 2018-07-13 VITALS
HEIGHT: 67 IN | WEIGHT: 263 LBS | DIASTOLIC BLOOD PRESSURE: 86 MMHG | BODY MASS INDEX: 41.28 KG/M2 | SYSTOLIC BLOOD PRESSURE: 136 MMHG | HEART RATE: 64 BPM

## 2018-07-13 DIAGNOSIS — E11.9 DIABETES MELLITUS WITH COINCIDENT HYPERTENSION (HCC): Chronic | ICD-10-CM

## 2018-07-13 DIAGNOSIS — E66.01 MORBID OBESITY WITH BMI OF 40.0-44.9, ADULT (HCC): ICD-10-CM

## 2018-07-13 DIAGNOSIS — E11.9 CONTROLLED TYPE 2 DIABETES MELLITUS WITHOUT COMPLICATION, WITHOUT LONG-TERM CURRENT USE OF INSULIN (HCC): Primary | ICD-10-CM

## 2018-07-13 DIAGNOSIS — E11.9 CONTROLLED TYPE 2 DIABETES MELLITUS WITHOUT COMPLICATION, WITHOUT LONG-TERM CURRENT USE OF INSULIN (HCC): ICD-10-CM

## 2018-07-13 DIAGNOSIS — I10 DIABETES MELLITUS WITH COINCIDENT HYPERTENSION (HCC): Chronic | ICD-10-CM

## 2018-07-13 DIAGNOSIS — G47.33 OBSTRUCTIVE SLEEP APNEA SYNDROME: Chronic | ICD-10-CM

## 2018-07-13 LAB
CHOLESTEROL, TOTAL: 90 MG/DL (ref 0–199)
CREATININE URINE: 170.1 MG/DL (ref 39–259)
ESTIMATED AVERAGE GLUCOSE: 154.2 MG/DL
HBA1C MFR BLD: 7 %
HDLC SERPL-MCNC: 53 MG/DL (ref 40–60)
LDL CHOLESTEROL CALCULATED: 26 MG/DL
MICROALBUMIN UR-MCNC: 11.7 MG/DL
MICROALBUMIN/CREAT UR-RTO: 68.8 MG/G (ref 0–30)
TRIGL SERPL-MCNC: 54 MG/DL (ref 0–150)
VLDLC SERPL CALC-MCNC: 11 MG/DL

## 2018-07-13 PROCEDURE — 99214 OFFICE O/P EST MOD 30 MIN: CPT | Performed by: INTERNAL MEDICINE

## 2018-07-13 ASSESSMENT — ENCOUNTER SYMPTOMS
SHORTNESS OF BREATH: 0
APNEA: 1

## 2018-07-13 NOTE — PROGRESS NOTES
Psychiatric: He has a normal mood and affect. His behavior is normal. Judgment and thought content normal.   Nursing note and vitals reviewed. Current Outpatient Prescriptions:     ONE TOUCH ULTRA TEST strip, USE TO TEST ONCE DAILY AS NEEDED, Disp: 100 strip, Rfl: 3    TRULICITY 6.96 SD/7.1RC SOPN, INJECT 0.75MG INTO THE SKIN ONCE A WEEK, Disp: 2 mL, Rfl: 5    metFORMIN (GLUCOPHAGE) 1000 MG tablet, TAKE 1 TABLET BY MOUTH TWICE DAILY WITH MEALS, Disp: 180 tablet, Rfl: 3    amLODIPine (NORVASC) 5 MG tablet, TAKE 1 TABLET BY MOUTH EVERY DAY, Disp: 90 tablet, Rfl: 3    losartan (COZAAR) 100 MG tablet, TAKE 1 TABLET BY MOUTH DAILY, Disp: 90 tablet, Rfl: 3    ONETOUCH DELICA LANCETS 54S MISC, USE THREE TIMES DAILY, Disp: 100 each, Rfl: 5    atorvastatin (LIPITOR) 20 MG tablet, TAKE 1 TABLET BY MOUTH DAILY, Disp: 90 tablet, Rfl: 3    triamcinolone (KENALOG) 0.1 % cream, Apply topically 2 times daily. , Disp: 453 g, Rfl: 1    terbinafine (LAMISIL) 250 MG tablet, Take 250 mg by mouth daily, Disp: , Rfl:     aspirin 81 MG EC tablet, Take 81 mg by mouth daily. , Disp: , Rfl:     Assessment/Plan:  Codey Natarajan was seen today for hypertension and diabetes. Diagnoses and all orders for this visit:    Controlled type 2 diabetes mellitus without complication, without long-term current use of insulin ([facility])  -      DIABETES FOOT EXAM  -     Microalbumin / creatinine urine ratio; Future  -     Lipid Panel; Future  -     Hemoglobin A1C; Future    Diabetes mellitus with coincident hypertension ([facility])  -      DIABETES FOOT EXAM  -     Microalbumin / creatinine urine ratio; Future  -     Lipid Panel; Future  -     Hemoglobin A1C; Future    Obstructive sleep apnea syndrome    Morbid obesity with BMI of 40.0-44.9, adult ([facility])  Comments:  Weight management clinic    Other orders  -     Cancel: Lipid Panel;  Future  -     Cancel:  DIABETES FOOT EXAM        Lab Results   Component Value Date    LABA1C 7.3 01/12/2018     Lab Results   Component Value Date    .8 01/12/2018       Jacob Altamirano MD

## 2018-07-26 ENCOUNTER — TELEPHONE (OUTPATIENT)
Dept: BARIATRICS/WEIGHT MGMT | Age: 58
End: 2018-07-26

## 2018-08-09 ENCOUNTER — OFFICE VISIT (OUTPATIENT)
Dept: BARIATRICS/WEIGHT MGMT | Age: 58
End: 2018-08-09

## 2018-08-09 VITALS
HEIGHT: 67 IN | BODY MASS INDEX: 41.28 KG/M2 | DIASTOLIC BLOOD PRESSURE: 84 MMHG | SYSTOLIC BLOOD PRESSURE: 138 MMHG | WEIGHT: 263 LBS | HEART RATE: 72 BPM

## 2018-08-09 DIAGNOSIS — Z79.899 HIGH RISK MEDICATIONS (NOT ANTICOAGULANTS) LONG-TERM USE: ICD-10-CM

## 2018-08-09 DIAGNOSIS — G47.33 OBSTRUCTIVE SLEEP APNEA: ICD-10-CM

## 2018-08-09 DIAGNOSIS — Z71.3 DIETARY COUNSELING AND SURVEILLANCE: ICD-10-CM

## 2018-08-09 DIAGNOSIS — E11.69 DIABETES MELLITUS TYPE 2 IN OBESE (HCC): ICD-10-CM

## 2018-08-09 DIAGNOSIS — E66.01 MORBID OBESITY WITH BMI OF 40.0-44.9, ADULT (HCC): ICD-10-CM

## 2018-08-09 DIAGNOSIS — I10 ESSENTIAL HYPERTENSION: ICD-10-CM

## 2018-08-09 DIAGNOSIS — E66.9 DIABETES MELLITUS TYPE 2 IN OBESE (HCC): ICD-10-CM

## 2018-08-09 DIAGNOSIS — E66.01 MORBID OBESITY WITH BMI OF 40.0-44.9, ADULT (HCC): Primary | ICD-10-CM

## 2018-08-09 LAB
A/G RATIO: 1.6 (ref 1.1–2.2)
ALBUMIN SERPL-MCNC: 4.6 G/DL (ref 3.4–5)
ALP BLD-CCNC: 69 U/L (ref 40–129)
ALT SERPL-CCNC: 17 U/L (ref 10–40)
ANION GAP SERPL CALCULATED.3IONS-SCNC: 16 MMOL/L (ref 3–16)
AST SERPL-CCNC: 20 U/L (ref 15–37)
BASOPHILS ABSOLUTE: 0.1 K/UL (ref 0–0.2)
BASOPHILS RELATIVE PERCENT: 1.1 %
BILIRUB SERPL-MCNC: 0.4 MG/DL (ref 0–1)
BUN BLDV-MCNC: 16 MG/DL (ref 7–20)
CALCIUM SERPL-MCNC: 9.8 MG/DL (ref 8.3–10.6)
CHLORIDE BLD-SCNC: 103 MMOL/L (ref 99–110)
CO2: 23 MMOL/L (ref 21–32)
CREAT SERPL-MCNC: 1.3 MG/DL (ref 0.8–1.3)
EOSINOPHILS ABSOLUTE: 0.3 K/UL (ref 0–0.6)
EOSINOPHILS RELATIVE PERCENT: 4 %
FOLATE: 13.9 NG/ML (ref 4.78–24.2)
GFR AFRICAN AMERICAN: >60
GFR NON-AFRICAN AMERICAN: 56
GLOBULIN: 2.8 G/DL
GLUCOSE BLD-MCNC: 99 MG/DL (ref 70–99)
HCT VFR BLD CALC: 40.7 % (ref 40.5–52.5)
HEMOGLOBIN: 13.6 G/DL (ref 13.5–17.5)
LYMPHOCYTES ABSOLUTE: 1.9 K/UL (ref 1–5.1)
LYMPHOCYTES RELATIVE PERCENT: 30.6 %
MCH RBC QN AUTO: 30.2 PG (ref 26–34)
MCHC RBC AUTO-ENTMCNC: 33.5 G/DL (ref 31–36)
MCV RBC AUTO: 90.2 FL (ref 80–100)
MONOCYTES ABSOLUTE: 0.6 K/UL (ref 0–1.3)
MONOCYTES RELATIVE PERCENT: 9.9 %
NEUTROPHILS ABSOLUTE: 3.4 K/UL (ref 1.7–7.7)
NEUTROPHILS RELATIVE PERCENT: 54.4 %
PDW BLD-RTO: 14.4 % (ref 12.4–15.4)
PLATELET # BLD: 224 K/UL (ref 135–450)
PMV BLD AUTO: 9.7 FL (ref 5–10.5)
POTASSIUM SERPL-SCNC: 4.6 MMOL/L (ref 3.5–5.1)
RBC # BLD: 4.51 M/UL (ref 4.2–5.9)
SODIUM BLD-SCNC: 142 MMOL/L (ref 136–145)
TOTAL PROTEIN: 7.4 G/DL (ref 6.4–8.2)
TSH REFLEX: 2.53 UIU/ML (ref 0.27–4.2)
VITAMIN B-12: 298 PG/ML (ref 211–911)
VITAMIN D 25-HYDROXY: 10.5 NG/ML
WBC # BLD: 6.2 K/UL (ref 4–11)

## 2018-08-09 PROCEDURE — 93000 ELECTROCARDIOGRAM COMPLETE: CPT | Performed by: FAMILY MEDICINE

## 2018-08-09 PROCEDURE — 99204 OFFICE O/P NEW MOD 45 MIN: CPT | Performed by: FAMILY MEDICINE

## 2018-08-09 ASSESSMENT — PATIENT HEALTH QUESTIONNAIRE - PHQ9
SUM OF ALL RESPONSES TO PHQ QUESTIONS 1-9: 0
2. FEELING DOWN, DEPRESSED OR HOPELESS: 0
SUM OF ALL RESPONSES TO PHQ QUESTIONS 1-9: 0
SUM OF ALL RESPONSES TO PHQ9 QUESTIONS 1 & 2: 0
1. LITTLE INTEREST OR PLEASURE IN DOING THINGS: 0

## 2018-08-09 ASSESSMENT — ENCOUNTER SYMPTOMS
GASTROINTESTINAL NEGATIVE: 1
EYES NEGATIVE: 1
RESPIRATORY NEGATIVE: 1

## 2018-08-09 NOTE — PROGRESS NOTES
eats until he is satisfied. Patient describes level of activity as sedentary. Goals  Weight: 175#  Health Improvement: get off DM/HTN meds    Assessment  Nutritional Needs: RMR=(9.99 x 119) + (6.25 x 170) - (4.92 x 59 y.o.) +5= 1962 kcal x 1.3 (sedentary activity factor)= 2550 kcal - 1000 (for 2 lb weight loss/week)= 1550 kcal.    Plan  Plan/Recommendations: General weight loss/lifestyle modification strategies discussed (elicit support from others; identify saboteurs; non-food rewards, etc). Optifast:  Not interested  Diet Medications: interested    PES Statement:  Overweight/Obesity related to increased caloric intake/decreased energy expenditure as evidenced by BMI. Body mass index is 41.19 kg/m². Handouts: 1500 kcal LC + NP packet, protein bar/shake purchasing guide    Will follow up as necessary.     Lillie Bryant

## 2018-08-09 NOTE — PROGRESS NOTES
gallop and no friction rub. No murmur heard. Pulmonary/Chest: Effort normal and breath sounds normal. No respiratory distress. He has no wheezes. He has no rales. Abdominal: Soft. He exhibits no mass. There is no tenderness. There is no rebound and no guarding. obese   Musculoskeletal: He exhibits no edema. Lymphadenopathy:     He has no cervical adenopathy. Neurological: He is alert and oriented to person, place, and time. Skin: Skin is warm and dry. Psychiatric: He has a normal mood and affect. His behavior is normal. Judgment and thought content normal.       Orders Only on 07/13/2018   Component Date Value Ref Range Status    Microalbumin, Random Urine 07/13/2018 11.70* <2.0 mg/dL Final    Creatinine, Ur 07/13/2018 170.1  39.0 - 259.0 mg/dL Final    Microalbumin Creatinine Ratio 07/13/2018 68.8* 0.0 - 30.0 mg/g Final    Cholesterol, Total 07/13/2018 90  0 - 199 mg/dL Final    Triglycerides 07/13/2018 54  0 - 150 mg/dL Final    HDL 07/13/2018 53  40 - 60 mg/dL Final    LDL Calculated 07/13/2018 26  <100 mg/dL Final    VLDL Cholesterol Calculated 07/13/2018 11  Not Established mg/dL Final    Hemoglobin A1C 07/13/2018 7.0  See comment % Final    Comment: Comment:  Diagnosis of Diabetes: > or = 6.5%  Increased risk of diabetes (Prediabetes): 5.7-6.4%  Glycemic Control: Nonpregnant Adults: <7.0%                    Pregnant: <6.0%        eAG 07/13/2018 154.2  mg/dL Final         Assessment and Plan:      ICD-10-CM ICD-9-CM    1. Morbid obesity with BMI of 40.0-44.9, adult ([facility]) E66.01 278.01 EKG 12 Lead-Sinus rhythm with rate variation    Heavily counseled on the importance of therapeutic lifestyle changes through diet and exercise. The patient understands that the goal of treatment is to reach and stay at a healthy weight. The initial treatment goal is to lose at least 5-10% of his body weight in 12 weeks.  This will require changes in eating habits, increased physical activity, and behavior Resistance/stength exercises   [x] ACSM recommendations (150 minutes/week)    [] Prevention of weight gain (150-250 minutes/week)        Behavior:   [x] Motivational interviewing performed    [] Referral for counseling  [x] Discussed strategies to overcome habits/challenges for focus      [] Stress management   [x] Stimulus control  [x] Sleep hygiene      Orders Placed This Encounter   Procedures    EKG 12 Lead     Order Specific Question:   Reason for Exam?     Answer: Other       No Follow-up on file. Greater than 50% of this 45 minute visit was used in direct counseling. This dictation was performed with a verbal recognition program (Dragon) and all efforts were made to ensure accuracy of this dictation. It is possible that there are still dictated errors within this note. If so, please bring any errors to my attention for correction.

## 2018-08-23 ENCOUNTER — TELEPHONE (OUTPATIENT)
Dept: INTERNAL MEDICINE CLINIC | Age: 58
End: 2018-08-23

## 2018-08-30 ENCOUNTER — OFFICE VISIT (OUTPATIENT)
Dept: BARIATRICS/WEIGHT MGMT | Age: 58
End: 2018-08-30

## 2018-08-30 VITALS
DIASTOLIC BLOOD PRESSURE: 82 MMHG | HEIGHT: 67 IN | WEIGHT: 259 LBS | SYSTOLIC BLOOD PRESSURE: 136 MMHG | BODY MASS INDEX: 40.65 KG/M2 | HEART RATE: 76 BPM

## 2018-08-30 DIAGNOSIS — E66.01 MORBID OBESITY WITH BMI OF 40.0-44.9, ADULT (HCC): Primary | ICD-10-CM

## 2018-08-30 DIAGNOSIS — Z71.3 DIETARY COUNSELING AND SURVEILLANCE: ICD-10-CM

## 2018-08-30 DIAGNOSIS — I10 ESSENTIAL HYPERTENSION: ICD-10-CM

## 2018-08-30 DIAGNOSIS — E66.9 DIABETES MELLITUS TYPE 2 IN OBESE (HCC): ICD-10-CM

## 2018-08-30 DIAGNOSIS — E11.69 DIABETES MELLITUS TYPE 2 IN OBESE (HCC): ICD-10-CM

## 2018-08-30 DIAGNOSIS — E55.9 VITAMIN D DEFICIENCY: ICD-10-CM

## 2018-08-30 PROCEDURE — 99214 OFFICE O/P EST MOD 30 MIN: CPT | Performed by: FAMILY MEDICINE

## 2018-08-30 ASSESSMENT — ENCOUNTER SYMPTOMS
RESPIRATORY NEGATIVE: 1
GASTROINTESTINAL NEGATIVE: 1
EYES NEGATIVE: 1

## 2018-08-30 NOTE — PROGRESS NOTES
Basophils % 08/09/2018 1.1  % Final    Neutrophils # 08/09/2018 3.4  1.7 - 7.7 K/uL Final    Lymphocytes # 08/09/2018 1.9  1.0 - 5.1 K/uL Final    Monocytes # 08/09/2018 0.6  0.0 - 1.3 K/uL Final    Eosinophils # 08/09/2018 0.3  0.0 - 0.6 K/uL Final    Basophils # 08/09/2018 0.1  0.0 - 0.2 K/uL Final         Assessment and Plan:    ICD-10-CM ICD-9-CM    1. Morbid obesity with BMI of 40.0-44.9, adult ([facility] E66.01 278.01 The patient is an appropriate candidate for weight loss. Losing weight will help the patient avoid/improve obesity related comorbidities. Discussed risks, benefits and alternatives of Qsymia. Patient meets BMI criteria, denies any significant coronary artery disease, glaucoma or hyperthyroidism. he denies MAOI use within the past 14 days and has no known hypersensitivity to the sympathomimetic amines, kidney or liver impairment. Start Qsymia 3.75 mg/23 mg once daily in the morning for two weeks and then follow-up in two weeks to determine further dosing. Explained to patient that I will monitor his weight loss every 12 weeks and if he has not lost at least 5% of his body weight, that I will either increase the medication or discontinue it. Counseled patient on proper use and potential side effects. Explained to patient that the maximum dose of this medication will need to be tapered when he is ready to discontinue it. he understands that abrupt cessation of this dose may cause adverse reactions including seizures. he understands that it is his  responsibility to make sure that he does not run out of medications and to follow up to his appointments every 24 weeks as recommended. Heavily counseled on the importance of therapeutic lifestyle changes through diet and exercise. Discussed possible side effects of palpitations, irritability, paresthesias, dizziness, dysgeusia, insomnia, constipation and dry mouth. Patient is responsible for keeping his monthly appointments. pancreatitis, or personal or family history of thyroid medullary cancer. Treatment start date: 8/31/18  12 weeks: 11/31/18  Starting weight: 259 pounds    Goal: At least 5% (12.5 pounds)    Key dietary points:    - Meats (preferably organic or grass fed) are great sources of protein and have no carbohydrates. - Recommend coconut, olive, avocado, or almond oils. - When buying dairy, choose regular or full fat options. - Choose vegetables that grow above ground as they are generally lower in carbohydrates and higher in fiber.  - Avoid starches such as bread, rice, potatoes, pasta and all sources of simple sugars (desserts, soda, breakfast cereals). - Choose beverages that are calorie and sugar free. Reminder regarding weight loss medications: You must be seen in office every 2-4 weeks to have your prescriptions refilled. If you are off of your medication for longer than 7 days, you will not be able to restart the medication for at least 6 months. Always call our office to report any side effects. No orders of the defined types were placed in this encounter. No Follow-up on file. This dictation was performed with a verbal recognition program (Dragon) and all efforts were made to ensure accuracy of this dictation. It is possible that there are still dictated errors within this note. If so, please bring any errors to my attention for correction.

## 2018-09-11 ENCOUNTER — OFFICE VISIT (OUTPATIENT)
Dept: BARIATRICS/WEIGHT MGMT | Age: 58
End: 2018-09-11

## 2018-09-11 VITALS
BODY MASS INDEX: 38.92 KG/M2 | HEIGHT: 67 IN | SYSTOLIC BLOOD PRESSURE: 136 MMHG | DIASTOLIC BLOOD PRESSURE: 82 MMHG | HEART RATE: 72 BPM | WEIGHT: 248 LBS

## 2018-09-11 DIAGNOSIS — E66.9 CLASS 2 OBESITY: Primary | ICD-10-CM

## 2018-09-11 DIAGNOSIS — E11.69 DIABETES MELLITUS TYPE 2 IN OBESE (HCC): ICD-10-CM

## 2018-09-11 DIAGNOSIS — E66.9 DIABETES MELLITUS TYPE 2 IN OBESE (HCC): ICD-10-CM

## 2018-09-11 DIAGNOSIS — Z71.3 DIETARY COUNSELING AND SURVEILLANCE: ICD-10-CM

## 2018-09-11 DIAGNOSIS — I10 ESSENTIAL HYPERTENSION: ICD-10-CM

## 2018-09-11 PROCEDURE — 99214 OFFICE O/P EST MOD 30 MIN: CPT | Performed by: FAMILY MEDICINE

## 2018-09-11 ASSESSMENT — ENCOUNTER SYMPTOMS
RESPIRATORY NEGATIVE: 1
GASTROINTESTINAL NEGATIVE: 1
EYES NEGATIVE: 1

## 2018-09-11 NOTE — PROGRESS NOTES
TABLET BY MOUTH DAILY, Disp: 90 tablet, Rfl: 3    ONETOUCH DELICA LANCETS 18W MISC, USE THREE TIMES DAILY, Disp: 100 each, Rfl: 5    atorvastatin (LIPITOR) 20 MG tablet, TAKE 1 TABLET BY MOUTH DAILY, Disp: 90 tablet, Rfl: 3    triamcinolone (KENALOG) 0.1 % cream, Apply topically 2 times daily. , Disp: 453 g, Rfl: 1    aspirin 81 MG EC tablet, Take 81 mg by mouth daily. , Disp: , Rfl:     Patient Active Problem List   Diagnosis    Obstructive sleep apnea syndrome    Diabetes mellitus with coincident hypertension ([address]    Morbid obesity due to excess calories (HCC)    Trigger ring finger of right hand    Controlled type 2 diabetes mellitus without complication, without long-term current use of insulin ([facility]       Review of Systems   HENT: Negative. Eyes: Negative. Respiratory: Negative. Cardiovascular: Negative. Gastrointestinal: Negative. Endocrine: Negative. Musculoskeletal: Negative. Neurological: Negative. Psychiatric/Behavioral: Negative. Physical Exam   Constitutional: He is oriented to person, place, and time. He appears well-developed and well-nourished. Eyes: Conjunctivae and EOM are normal.   Cardiovascular: Normal rate and normal heart sounds. Exam reveals no gallop and no friction rub. No murmur heard. Pulmonary/Chest: Effort normal and breath sounds normal. No respiratory distress. He has no wheezes. He has no rales. Neurological: He is alert and oriented to person, place, and time. Skin: Skin is warm and dry. Psychiatric: He has a normal mood and affect. His behavior is normal. Judgment and thought content normal.       Orders Only on 08/09/2018   Component Date Value Ref Range Status    Vitamin B-12 08/09/2018 298  211 - 911 pg/mL Final    Folate 08/09/2018 13.90  4.78 - 24.20 ng/mL Final    Comment: Effective 11-15-16 10:00am EST  Please note reference ranges have  changed for Folate.       Vit D, 25-Hydroxy 08/09/2018 10.5* >=30 ng/mL Final of weight gain (150-250 minutes/week)           Behavior: [x] Motivational interviewing performed    [] Referral for counseling                         [x] Discussed strategies to overcome habits/challenges for focus         [] Stress management   [] Stimulus control         [] Sleep hygiene      Reviewed:  [x] Nutrition and the importance of regular protein intake  [x] Hidden carbohydrate sources  [x] Alcohol use  [x] Tobacco use   [x] Drug use- Denies  [x] Importance of exercise and reducing sedentary time  [x] Treatment consent- Patient understands and agrees with the treatment plan   [x] Proper use of medication and side effects  [x] OARRS report    Controlled Substances Monitoring:     RX Monitoring 9/11/2018   Attestation The Prescription Monitoring Report for this patient was reviewed today. Documentation Possible medication side effects, risk of tolerance/dependence & alternative treatments discussed. Patient denies any history of cardiovascular disease (e.g., CAD, stroke, arrhythmias, CHF, uncontrolled HTN), seizure disorder, MAOI use within the last 2 weeks, hyperthyroidism, glaucoma, agitated states, history of drug abuse, known hypersensitivity to the prescribing meds, history of pancreatitis, or personal or family history of thyroid medullary cancer. Treatment start date: 8/31/18  12 weeks: 11/31/18  Starting weight: 259 pounds    Goal: At least 5% (12.5 pounds)  Total weight loss: 11 pounds    Key dietary points:    - Meats (preferably organic or grass fed) are great sources of protein and have no carbohydrates. - Recommend coconut, olive, avocado, or almond oils. - When buying dairy, choose regular or full fat options. - Choose vegetables that grow above ground as they are generally lower in carbohydrates and higher in fiber.  - Avoid starches such as bread, rice, potatoes, pasta and all sources of simple sugars (desserts, soda, breakfast cereals).   - Choose beverages that are

## 2018-09-11 NOTE — PATIENT INSTRUCTIONS
Patient Education        Learning About Obesity  What is obesity? Obesity means having so much body fat that your health is in danger. Having too much body fat can lead to type 2 diabetes, heart disease, high blood pressure, arthritis, sleep apnea, and stroke. Even if you don't feel bad now, think about these health risks. Do they seem like a good reason to start on a new path toward a healthier weight? Or do you have another personal, powerful reason for wanting to lose weight? Whatever it is, keep it in mind. It can be hard to change eating habits and exercise habits. But with your own reason and plan, you can do it. How do you know if your weight is in the obesity range? To know if your weight is in the obesity range, your doctor looks at your body mass index (BMI) and waist size. Your BMI is a number that is calculated from your weight and your height. To figure your BMI for yourself, get a BMI table from your doctor or use an online tool, such as http://www.paz.com/ on the [employer] of [employer]. A healthy BMI is from 18.5 to 24.9. If your BMI is from 30.0 to 39.9, you are considered to have obesity. If your BMI is over 40.0, you are considered to have extreme obesity. What causes obesity? When you take in more calories than you burn off, you gain weight. How you eat, how active you are, and other things affect how your body uses calories and whether you gain weight. If you have family members who have too much body fat, you may have inherited a tendency to gain weight. And your family also helps form your eating and lifestyle habits, which can lead to obesity. Also, our busy lives make it harder to plan and cook healthy meals. For many of us, it's easier to reach for prepared foods, go out to eat, or go to the drive-through. But these foods are often high in saturated fat and calories. Portions are often too large.   What can you do to

## 2018-10-16 ENCOUNTER — OFFICE VISIT (OUTPATIENT)
Dept: BARIATRICS/WEIGHT MGMT | Age: 58
End: 2018-10-16
Payer: COMMERCIAL

## 2018-10-16 VITALS
SYSTOLIC BLOOD PRESSURE: 132 MMHG | HEART RATE: 72 BPM | WEIGHT: 237 LBS | DIASTOLIC BLOOD PRESSURE: 70 MMHG | HEIGHT: 67 IN | BODY MASS INDEX: 37.2 KG/M2

## 2018-10-16 DIAGNOSIS — E66.9 CLASS 2 OBESITY: Primary | ICD-10-CM

## 2018-10-16 DIAGNOSIS — E11.69 DIABETES MELLITUS TYPE 2 IN OBESE (HCC): ICD-10-CM

## 2018-10-16 DIAGNOSIS — E66.9 DIABETES MELLITUS TYPE 2 IN OBESE (HCC): ICD-10-CM

## 2018-10-16 DIAGNOSIS — E55.9 VITAMIN D DEFICIENCY: ICD-10-CM

## 2018-10-16 LAB
A/G RATIO: 1.9 (ref 1.1–2.2)
ALBUMIN SERPL-MCNC: 4.6 G/DL (ref 3.4–5)
ALP BLD-CCNC: 77 U/L (ref 40–129)
ALT SERPL-CCNC: 21 U/L (ref 10–40)
ANION GAP SERPL CALCULATED.3IONS-SCNC: 15 MMOL/L (ref 3–16)
AST SERPL-CCNC: 16 U/L (ref 15–37)
BILIRUB SERPL-MCNC: 0.4 MG/DL (ref 0–1)
BUN BLDV-MCNC: 20 MG/DL (ref 7–20)
CALCIUM SERPL-MCNC: 10.1 MG/DL (ref 8.3–10.6)
CHLORIDE BLD-SCNC: 105 MMOL/L (ref 99–110)
CO2: 22 MMOL/L (ref 21–32)
CREAT SERPL-MCNC: 1.2 MG/DL (ref 0.8–1.3)
GFR AFRICAN AMERICAN: >60
GFR NON-AFRICAN AMERICAN: >60
GLOBULIN: 2.4 G/DL
GLUCOSE BLD-MCNC: 117 MG/DL (ref 70–99)
POTASSIUM SERPL-SCNC: 4.9 MMOL/L (ref 3.5–5.1)
SODIUM BLD-SCNC: 142 MMOL/L (ref 136–145)
TOTAL PROTEIN: 7 G/DL (ref 6.4–8.2)
VITAMIN D 25-HYDROXY: 47.2 NG/ML

## 2018-10-16 PROCEDURE — 99213 OFFICE O/P EST LOW 20 MIN: CPT | Performed by: FAMILY MEDICINE

## 2018-10-16 ASSESSMENT — ENCOUNTER SYMPTOMS
EYES NEGATIVE: 1
RESPIRATORY NEGATIVE: 1
GASTROINTESTINAL NEGATIVE: 1

## 2018-10-16 NOTE — PROGRESS NOTES
Patient: Reinaldo Drew                      Encounter Date: 10/16/2018    YOB: 1960                Age: 59 y.o. Chief Complaint   Patient presents with    Obesity     4th MWM       /70   Pulse 72   Ht 5' 7\" (1.702 m)   Wt 237 lb (107.5 kg)   BMI 37.12 kg/m²     Body mass index is 37.12 kg/m². HPI: 59 y.o. male with a long-standing history of obesity presents today for his 1-month follow-up. he has lost 11 pounds since his last visit. Current treatment includes low carb/david diet. Stopped Qsymia two weeks ago due to side effects (decreased energy and little appetite). Met with the dietitian today. Food recall reviewed with the patient. Making good choices. Discontinued Trulicity. Blood sugar running in the low 100s-120s. Exercise: [x]Cardio     [x]Resistance/strength training     []Other:     Allergies   Allergen Reactions    Pcn [Penicillins] Hives and Swelling         Current Outpatient Prescriptions:     vitamin D (CHOLECALCIFEROL) 99121 UNIT CAPS, Take 1 capsule by mouth once a week for 8 doses, Disp: 8 capsule, Rfl: 0    ONE TOUCH ULTRA TEST strip, USE TO TEST ONCE DAILY AS NEEDED, Disp: 100 strip, Rfl: 3    metFORMIN (GLUCOPHAGE) 1000 MG tablet, TAKE 1 TABLET BY MOUTH TWICE DAILY WITH MEALS, Disp: 180 tablet, Rfl: 3    amLODIPine (NORVASC) 5 MG tablet, TAKE 1 TABLET BY MOUTH EVERY DAY, Disp: 90 tablet, Rfl: 3    losartan (COZAAR) 100 MG tablet, TAKE 1 TABLET BY MOUTH DAILY, Disp: 90 tablet, Rfl: 3    ONETOUCH DELICA LANCETS 15M MISC, USE THREE TIMES DAILY, Disp: 100 each, Rfl: 5    atorvastatin (LIPITOR) 20 MG tablet, TAKE 1 TABLET BY MOUTH DAILY, Disp: 90 tablet, Rfl: 3    triamcinolone (KENALOG) 0.1 % cream, Apply topically 2 times daily. , Disp: 453 g, Rfl: 1    aspirin 81 MG EC tablet, Take 81 mg by mouth daily.   , Disp: , Rfl:     Patient Active Problem List   Diagnosis    Obstructive sleep apnea syndrome    Diabetes mellitus with coincident hypertension Comment: Chronic Kidney Disease: less than 60 ml/min/1.73 sq.m. Kidney Failure: less than 15 ml/min/1.73 sq.m. Results valid for patients 18 years and older.  Calcium 08/09/2018 9.8  8.3 - 10.6 mg/dL Final    Total Protein 08/09/2018 7.4  6.4 - 8.2 g/dL Final    Alb 08/09/2018 4.6  3.4 - 5.0 g/dL Final    Albumin/Globulin Ratio 08/09/2018 1.6  1.1 - 2.2 Final    Total Bilirubin 08/09/2018 0.4  0.0 - 1.0 mg/dL Final    Alkaline Phosphatase 08/09/2018 69  40 - 129 U/L Final    ALT 08/09/2018 17  10 - 40 U/L Final    AST 08/09/2018 20  15 - 37 U/L Final    Globulin 08/09/2018 2.8  g/dL Final    WBC 08/09/2018 6.2  4.0 - 11.0 K/uL Final    RBC 08/09/2018 4.51  4.20 - 5.90 M/uL Final    Hemoglobin 08/09/2018 13.6  13.5 - 17.5 g/dL Final    Hematocrit 08/09/2018 40.7  40.5 - 52.5 % Final    MCV 08/09/2018 90.2  80.0 - 100.0 fL Final    MCH 08/09/2018 30.2  26.0 - 34.0 pg Final    MCHC 08/09/2018 33.5  31.0 - 36.0 g/dL Final    RDW 08/09/2018 14.4  12.4 - 15.4 % Final    Platelets 84/13/4094 224  135 - 450 K/uL Final    MPV 08/09/2018 9.7  5.0 - 10.5 fL Final    Neutrophils % 08/09/2018 54.4  % Final    Lymphocytes % 08/09/2018 30.6  % Final    Monocytes % 08/09/2018 9.9  % Final    Eosinophils % 08/09/2018 4.0  % Final    Basophils % 08/09/2018 1.1  % Final    Neutrophils # 08/09/2018 3.4  1.7 - 7.7 K/uL Final    Lymphocytes # 08/09/2018 1.9  1.0 - 5.1 K/uL Final    Monocytes # 08/09/2018 0.6  0.0 - 1.3 K/uL Final    Eosinophils # 08/09/2018 0.3  0.0 - 0.6 K/uL Final    Basophils # 08/09/2018 0.1  0.0 - 0.2 K/uL Final         Assessment and Plan:    ICD-10-CM    1. Class 2 obesity E66.9 Improving. Continue low carb/david diet. Increase exercise. Reinforced dietitian's recommendations. 2. BMI 37.0-37.9, adult Z68.37    3. Diabetes mellitus type 2 in obese (HCC) E11.69 Hemoglobin A1C    E66.9 Comprehensive Metabolic Panel   4.  Vitamin D deficiency E55.9 Vitamin D 25 Hydroxy Nutrition Plan: [] LCHF/Ketogenic   [x] Modified low-calorie diet (low carb/low-david)               [] Low-calorie diet    [] Maintenance       []Other        Exercise: [x] Cardio     [x] Resistance/strength training                       [x] ACSM recommendations (150 minutes/week in active weight loss)       [] Prevention of weightgain (150-250 minutes/week)           Behavior: [x] Motivational interviewing performed    [] Referralfor counseling                         [x] Discussed strategies to overcome habits/challenges for focus         [] Stress management   [x] Stimulus control         [] Sleep hygiene      Reviewed:  [x] Nutrition and the importance of regular protein intake  [x] Hidden carbohydrate sources  [x] Alcohol use  [x] Tobacco use   [x] Drug use- Denies  [x] Importance of exercise and reducingsedentary time  [x] Treatment consent- Patient understands and agrees with the treatment plan   [x] Proper use of medication and side effects  [x] OARRS report    Controlled Substances Monitoring:     RX Monitoring 10/16/2018   Attestation The Prescription Monitoring Report for this patient was reviewed today. Documentation Possible medication side effects, risk of tolerance/dependence & alternative treatments discussed. Patient denies any history of cardiovascular disease (e.g., CAD, stroke, arrhythmias, CHF, uncontrolled HTN), seizuredisorder, MAOI use within the last 2 weeks, hyperthyroidism, glaucoma, agitated states, history of drug abuse, known hypersensitivity to the prescribing meds, history of pancreatitis, or personal orfamily history of thyroid medullary cancer. Key dietary points:    - Meats (preferablyorganic or grass fed) are great sources of protein and have no carbohydrates. - Recommend coconut, olive, avocado, or almond oils. - When buying dairy, choose regular or full fat options.   - Choose vegetables thatgrow above ground as they are generally lower in carbohydrates and higher in fiber.  - Avoid starches such as bread, rice, potatoes, pasta and all sources of simple sugars (desserts, soda, breakfast cereals). - Choosebeverages that are calorie and sugar free. Orders Placed This Encounter   Procedures    Hemoglobin A1C     Standing Status:   Future     Number of Occurrences:   1     Standing Expiration Date:   10/16/2019    Comprehensive Metabolic Panel     Standing Status:   Future     Number of Occurrences:   1     Standing Expiration Date:   10/16/2019    Vitamin D 25 Hydroxy     Standing Status:   Future     Number of Occurrences:   1     Standing Expiration Date:   10/16/2019       No Follow-up on file. This dictation was performed with a verbal recognition program (Dragon) and all efforts were made to ensure accuracy of this dictation. It is possible that there are stilldictated errors within this note. If so, please bring any errors to my attention for correction.

## 2018-10-17 LAB
ESTIMATED AVERAGE GLUCOSE: 119.8 MG/DL
HBA1C MFR BLD: 5.8 %

## 2018-11-16 ENCOUNTER — OFFICE VISIT (OUTPATIENT)
Dept: BARIATRICS/WEIGHT MGMT | Age: 58
End: 2018-11-16
Payer: COMMERCIAL

## 2018-11-16 VITALS
HEART RATE: 72 BPM | DIASTOLIC BLOOD PRESSURE: 96 MMHG | HEIGHT: 67 IN | SYSTOLIC BLOOD PRESSURE: 148 MMHG | BODY MASS INDEX: 37.35 KG/M2 | WEIGHT: 238 LBS

## 2018-11-16 DIAGNOSIS — E66.9 DIABETES MELLITUS TYPE 2 IN OBESE (HCC): ICD-10-CM

## 2018-11-16 DIAGNOSIS — E66.9 CLASS 2 OBESITY: Primary | ICD-10-CM

## 2018-11-16 DIAGNOSIS — E11.69 DIABETES MELLITUS TYPE 2 IN OBESE (HCC): ICD-10-CM

## 2018-11-16 PROCEDURE — 99213 OFFICE O/P EST LOW 20 MIN: CPT | Performed by: FAMILY MEDICINE

## 2018-11-16 RX ORDER — DULAGLUTIDE 0.75 MG/.5ML
INJECTION, SOLUTION SUBCUTANEOUS
Refills: 5 | COMMUNITY
Start: 2018-10-18 | End: 2018-11-28 | Stop reason: SDUPTHER

## 2018-11-16 RX ORDER — ACETAMINOPHEN 160 MG
1 TABLET,DISINTEGRATING ORAL DAILY
COMMUNITY

## 2018-11-16 ASSESSMENT — ENCOUNTER SYMPTOMS
GASTROINTESTINAL NEGATIVE: 1
RESPIRATORY NEGATIVE: 1
EYES NEGATIVE: 1

## 2018-11-16 NOTE — PROGRESS NOTES
Patient: Hong Wayne                      Encounter Date: 11/16/2018    YOB: 1960               Age: 59 y.o. Chief Complaint   Patient presents with    Weight Management     5th MWM, 1500 david/ LC, Qsymia       BP (!) 148/96   Pulse 72   Ht 5' 7\" (1.702 m)   Wt 238 lb (108 kg)   BMI 37.28 kg/m²     Body mass index is 37.28 kg/m². HPI: 59 y.o. male with a long-standing history of obesity presents today for follow-up. He has gained a pound since his last visit. Trying to follow a low carbohydrate diet. Hasn't been using the prescribed meal plan as closely as before. Met with the dietitian today. Food recall and assessment reviewed. Overall, making good dietary choices on most days. Not exercising due to back pain. Labs reviewed. HbA1c 7.0-->5.8. Diet: []LCHF/Ketogenic [x]Modified low-calorie/low carb diet  []Low-calorie diet          []Maintenance       []Other:         Adherent? Somewhat              Exercise: []Cardio     []Resistance/strength training     [x]Other: None    Allergies   Allergen Reactions    Pcn [Penicillins] Hives and Swelling         Current Outpatient Prescriptions:     Cholecalciferol (VITAMIN D3) 2000 units CAPS, Take 1 capsule by mouth daily, Disp: , Rfl:     ONE TOUCH ULTRA TEST strip, USE TO TEST ONCE DAILY AS NEEDED, Disp: 100 strip, Rfl: 3    metFORMIN (GLUCOPHAGE) 1000 MG tablet, TAKE 1 TABLET BY MOUTH TWICE DAILY WITH MEALS, Disp: 180 tablet, Rfl: 3    amLODIPine (NORVASC) 5 MG tablet, TAKE 1 TABLET BY MOUTH EVERY DAY, Disp: 90 tablet, Rfl: 3    losartan (COZAAR) 100 MG tablet, TAKE 1 TABLET BY MOUTH DAILY, Disp: 90 tablet, Rfl: 3    ONETOUCH DELICA LANCETS 87N MISC, USE THREE TIMES DAILY, Disp: 100 each, Rfl: 5    atorvastatin (LIPITOR) 20 MG tablet, TAKE 1 TABLET BY MOUTH DAILY, Disp: 90 tablet, Rfl: 3    triamcinolone (KENALOG) 0.1 % cream, Apply topically 2 times daily. , Disp: 453 g, Rfl: 1    aspirin 81 MG EC tablet, Take 81 mg by mouth

## 2019-02-25 ENCOUNTER — HOSPITAL ENCOUNTER (OUTPATIENT)
Age: 59
Discharge: HOME | End: 2019-02-25
Payer: COMMERCIAL

## 2019-02-25 ENCOUNTER — HOSPITAL ENCOUNTER (OUTPATIENT)
Dept: GENERAL RADIOLOGY | Age: 59
Discharge: HOME | End: 2019-02-25
Payer: COMMERCIAL

## 2019-02-25 ENCOUNTER — OFFICE VISIT (OUTPATIENT)
Dept: INTERNAL MEDICINE CLINIC | Age: 59
End: 2019-02-25
Payer: COMMERCIAL

## 2019-02-25 VITALS
WEIGHT: 241 LBS | HEART RATE: 64 BPM | HEIGHT: 67 IN | BODY MASS INDEX: 37.83 KG/M2 | DIASTOLIC BLOOD PRESSURE: 76 MMHG | SYSTOLIC BLOOD PRESSURE: 122 MMHG

## 2019-02-25 DIAGNOSIS — I10 DIABETES MELLITUS WITH COINCIDENT HYPERTENSION (HCC): Chronic | ICD-10-CM

## 2019-02-25 DIAGNOSIS — H65.193 OTHER ACUTE NONSUPPURATIVE OTITIS MEDIA OF BOTH EARS, RECURRENCE NOT SPECIFIED: ICD-10-CM

## 2019-02-25 DIAGNOSIS — M54.50 LUMBAR SPINE PAIN: ICD-10-CM

## 2019-02-25 DIAGNOSIS — E66.01 MORBID OBESITY DUE TO EXCESS CALORIES (HCC): Chronic | ICD-10-CM

## 2019-02-25 DIAGNOSIS — G47.33 OBSTRUCTIVE SLEEP APNEA SYNDROME: Chronic | ICD-10-CM

## 2019-02-25 DIAGNOSIS — E11.9 DIABETES MELLITUS WITH COINCIDENT HYPERTENSION (HCC): Primary | Chronic | ICD-10-CM

## 2019-02-25 DIAGNOSIS — E11.9 DIABETES MELLITUS WITH COINCIDENT HYPERTENSION (HCC): Chronic | ICD-10-CM

## 2019-02-25 DIAGNOSIS — I10 DIABETES MELLITUS WITH COINCIDENT HYPERTENSION (HCC): Primary | Chronic | ICD-10-CM

## 2019-02-25 LAB
CREATININE URINE: 97.6 MG/DL (ref 39–259)
MICROALBUMIN UR-MCNC: 4.2 MG/DL
MICROALBUMIN/CREAT UR-RTO: 43 MG/G (ref 0–30)

## 2019-02-25 PROCEDURE — 36415 COLL VENOUS BLD VENIPUNCTURE: CPT

## 2019-02-25 PROCEDURE — 82043 UR ALBUMIN QUANTITATIVE: CPT

## 2019-02-25 PROCEDURE — 82570 ASSAY OF URINE CREATININE: CPT

## 2019-02-25 PROCEDURE — 83036 HEMOGLOBIN GLYCOSYLATED A1C: CPT

## 2019-02-25 PROCEDURE — 72100 X-RAY EXAM L-S SPINE 2/3 VWS: CPT

## 2019-02-25 PROCEDURE — 99214 OFFICE O/P EST MOD 30 MIN: CPT | Performed by: INTERNAL MEDICINE

## 2019-02-25 RX ORDER — CELECOXIB 200 MG/1
200 CAPSULE ORAL DAILY
Qty: 30 CAPSULE | Refills: 3 | Status: SHIPPED | OUTPATIENT
Start: 2019-02-25 | End: 2019-12-03 | Stop reason: SDUPTHER

## 2019-02-25 RX ORDER — CELECOXIB 200 MG/1
200 CAPSULE ORAL DAILY
Qty: 30 CAPSULE | Refills: 3 | Status: SHIPPED | OUTPATIENT
Start: 2019-02-25 | End: 2019-02-25 | Stop reason: SDUPTHER

## 2019-02-25 RX ORDER — AZELASTINE 1 MG/ML
1 SPRAY, METERED NASAL 2 TIMES DAILY
Qty: 2 BOTTLE | Refills: 1 | Status: SHIPPED | OUTPATIENT
Start: 2019-02-25 | End: 2019-05-03 | Stop reason: ALTCHOICE

## 2019-02-25 RX ORDER — AZELASTINE 1 MG/ML
1 SPRAY, METERED NASAL 2 TIMES DAILY
Qty: 2 BOTTLE | Refills: 1 | Status: SHIPPED | OUTPATIENT
Start: 2019-02-25 | End: 2019-02-25 | Stop reason: SDUPTHER

## 2019-02-25 ASSESSMENT — PATIENT HEALTH QUESTIONNAIRE - PHQ9
2. FEELING DOWN, DEPRESSED OR HOPELESS: 0
SUM OF ALL RESPONSES TO PHQ QUESTIONS 1-9: 0
1. LITTLE INTEREST OR PLEASURE IN DOING THINGS: 0
SUM OF ALL RESPONSES TO PHQ QUESTIONS 1-9: 0
SUM OF ALL RESPONSES TO PHQ9 QUESTIONS 1 & 2: 0

## 2019-02-26 ENCOUNTER — TELEPHONE (OUTPATIENT)
Dept: INTERNAL MEDICINE CLINIC | Age: 59
End: 2019-02-26

## 2019-02-26 DIAGNOSIS — S32.010A CLOSED COMPRESSION FRACTURE OF FIRST LUMBAR VERTEBRA, INITIAL ENCOUNTER: Primary | ICD-10-CM

## 2019-02-26 DIAGNOSIS — E55.9 VITAMIN D DEFICIENCY: Primary | ICD-10-CM

## 2019-02-26 LAB
ESTIMATED AVERAGE GLUCOSE: 131.2 MG/DL
HBA1C MFR BLD: 6.2 %

## 2019-02-27 ENCOUNTER — TELEPHONE (OUTPATIENT)
Dept: RHEUMATOLOGY | Age: 59
End: 2019-02-27

## 2019-03-04 ENCOUNTER — HOSPITAL ENCOUNTER (OUTPATIENT)
Dept: MRI IMAGING | Age: 59
Discharge: HOME | End: 2019-03-04
Payer: COMMERCIAL

## 2019-03-04 ENCOUNTER — HOSPITAL ENCOUNTER (OUTPATIENT)
Age: 59
Discharge: HOME | End: 2019-03-04
Payer: COMMERCIAL

## 2019-03-04 ENCOUNTER — HOSPITAL ENCOUNTER (OUTPATIENT)
Dept: GENERAL RADIOLOGY | Age: 59
Discharge: HOME | End: 2019-03-04
Payer: COMMERCIAL

## 2019-03-04 DIAGNOSIS — S32.010A CLOSED COMPRESSION FRACTURE OF FIRST LUMBAR VERTEBRA, INITIAL ENCOUNTER: ICD-10-CM

## 2019-03-04 LAB — VITAMIN D 25-HYDROXY: 30.5 NG/ML

## 2019-03-04 PROCEDURE — 72148 MRI LUMBAR SPINE W/O DYE: CPT

## 2019-03-04 PROCEDURE — 36415 COLL VENOUS BLD VENIPUNCTURE: CPT

## 2019-03-04 PROCEDURE — 82306 VITAMIN D 25 HYDROXY: CPT

## 2019-03-04 PROCEDURE — 77080 DXA BONE DENSITY AXIAL: CPT

## 2019-03-04 PROCEDURE — 82652 VIT D 1 25-DIHYDROXY: CPT

## 2019-03-05 ENCOUNTER — TELEPHONE (OUTPATIENT)
Dept: INTERNAL MEDICINE CLINIC | Age: 59
End: 2019-03-05

## 2019-03-05 DIAGNOSIS — M51.9 LUMBAR DISC DISEASE: Primary | ICD-10-CM

## 2019-03-06 LAB — VITAMIN D 1,25-DIHYDROXY: 39 PG/ML (ref 19.9–79.3)

## 2019-04-11 RX ORDER — BLOOD-GLUCOSE METER
EACH MISCELLANEOUS
Qty: 1 KIT | Refills: 0 | Status: SHIPPED | OUTPATIENT
Start: 2019-04-11

## 2019-04-17 ENCOUNTER — OFFICE VISIT (OUTPATIENT)
Dept: ORTHOPEDIC SURGERY | Age: 59
End: 2019-04-17
Payer: COMMERCIAL

## 2019-04-17 VITALS
WEIGHT: 240 LBS | RESPIRATION RATE: 16 BRPM | HEIGHT: 67 IN | SYSTOLIC BLOOD PRESSURE: 143 MMHG | DIASTOLIC BLOOD PRESSURE: 82 MMHG | BODY MASS INDEX: 37.67 KG/M2

## 2019-04-17 DIAGNOSIS — M65.311 TRIGGER FINGER OF RIGHT THUMB: Primary | ICD-10-CM

## 2019-04-17 PROCEDURE — 99212 OFFICE O/P EST SF 10 MIN: CPT | Performed by: ORTHOPAEDIC SURGERY

## 2019-04-17 NOTE — LETTER
[facility]  Surgery Scheduling Form:      DEMOGRAPHICS:                                                                                                                Patient Name:  Zehra Dempsey  Patient :  1960   Patient SS#:  [id number]    Patient Phone:  [phone] (home) [phone] (work)   Patient Address:  [address] [place] [id number]    PCP:  Precious Guerra MD  Insurance:   Payor/Plan Subscr  Sex Relation Sub. Ins. ID Effective Group Num   1. [id number] 1956 Female  [id number] 1/1/15 106                                   [address]     DIAGNOSIS & PROCEDURE:                                                                                              Diagnosis:   right Thumb Trigger Finger (727.03)    M65.311  Operation:  right Thumb Trigger Finger Release  [CPT: 82556]  Location:    [place]  Surgeon:  Min Franco    SCHEDULING INFORMATION:                                                                                         .  Surgeon's Scheduling Instruction:  elective  Requested Date:    OR Time:  9:45 Patient Arrival Time:  8:15OR Time Required:  10  Minutes  Anesthesia:  MAC/TIVA  Equipment:  None  Mini C-Arm:  No   Standard C-Arm:  No  Status:  outpatient  PAT Required: Yes                          ALLERGIES:   PCN  Comments: Do not call for 2-3 days. Please remind the patient to stop taking aspirin 7 days before surgery. Benita Gleason MD  19     BILLING INFORMATION:                                                                                                     Procedure:       CPT Code Modifier

## 2019-04-17 NOTE — PROGRESS NOTES
Patient returns to the office for evaluation of   Chief Complaint   Patient presents with    Hand Problem     right thumb snapping and clicking, pain   I last examined this patient 9 months ago at which time I injected the right thumb for treatment of trigger finger. He obtained prompt and complete relief of all symptoms. Unfortunately, the condition has recurred and the patient returns to the office requesting additional treatment, despite the fact that I informed him at the last visit that he had reached to limit on steroid injections for this digit. Naveed Youngblood He complains of pain, swelling, catching and stiffness of the digit for the past 1 month. He has had previous trigger finger surgery for his right ring finger. Symptoms have become worse recently. The patient's social history, past medical history, family history, medications, allergies and review of systems have been reviewed, and dated and are recorded in the chart. On examination there is mild soft tissue swelling of the digit. There is no deformity. There is tenderness, thickening and nodularity at the base of the flexor tendon sheath. Range of motion is slightly limited in flexion and extension. The digit sticks in flexion and pops into extension, accompanied by some pain. Skin is intact without lesions. Distal circulation and sensation are intact. Muscle strength and coordination are normal.  Reflexes and present bilaterally. Joints are stable. There are no subcutaneous nodules or enlarged epitrochlear lymph nodes. Impression: Recurrent right thumb trigger digit. The nature of their medical problem is fully discussed with the patient, including all treatment options. Surgery is also discussed, including the possible risks, complications, prognosis and postoperative care. All questions are answered. The surgery consent form is explained and signed.  Surgery will be scheduled and the patient is asked to call me if there are any additional questions. The patient understands that the surgery will be done by Dr. Vitaliy Alfonso. The patient is instructed to stop taking aspirin 7 days before surgery. They will check with their medical doctor(s), if needed, to ask permission to do so.

## 2019-04-17 NOTE — LETTER
CONSENT TO OPERATION  AND/OR OTHER PROCEDURE(S)          PATIENT : Ashwini Coates OF BIRTH:  1960    DATE : 4/17/19          1. I request and consent that Dr. Kimmy Moore. Ana María Guillaume,  and/or his associates or assistants perform an operation and/or procedures on the above patient at  [place] [address], to treat the condition(s) which appear indicated by the diagnostic studies already performed. I have been told that in general terms the nature, purpose and reasonable expectations of the operation and/or procedure(s) are:     Release Right Trigger Thumb      2. It has been explained to me by the informing physician that during the course of the operation and/or procedure(s) unforeseen conditions may be revealed that necessitate an extension of the original operation and/or procedure(s) or different operation and/or procedures than those set forth in Paragraph 1. I therefore authorize and request that my physician and/or his associates or assistants perform such operations and/or procedures as are necessary and desirable in the exercise of professional judgment. The authority granted under this Paragraph 2 shall extend to all conditions that require treatment and are known to my physician at the time the operation is commenced. 3. I have been made aware by the informing physician of certain risks and consequences that are associated with the operation and/or procedure(s) described in Paragraph 1, the reasonable alternative methods or treatment, the possible consequences, the possibility that the operation and/or procedure(s) may be unsuccessful and the possibility of complications. I understand the reasonably known risks to be:      ? Bleeding  ? Infection  ? Poor Healing  ? Possible Damage to Nerve, Vessel, Tendon/Muscle or Bone  ? Need for further Treatment/Surgery  ? Stiffness  ? Pain  ? Residual or Recurrent Symptoms  ? Anesthetic and/or Medical Risks  ? 4. I have also been informed by the informing physician that there are other risks from both known and unknown causes that are attendant to the performance of any surgical procedure. I am aware that the practice of medicine and surgery is not an exact science, and that no guarantees have been made to me concerning the results of the operation and/or procedure(s). 5. I   CONSENT / REFUSE CONSENT  (strike the phrase that does not apply) to the taking of photographs before, during and/or after the operation or procedure for scientific/educational purposes. 6. I consent to the administration of anesthesia and to the use of such anesthetics as may be deemed advisable by the anesthesiologist who has been engaged by me or my physician. 7. I certify that I have read and understand the above consent to operation and/or other procedure(s); that the explanations therein referred to were made to me by the informing physician in advance of my signing this consent; that all blanks or statements requiring insertion or completion were filled in and inapplicable paragraphs, if any, were stricken before I signed; and that all questions asked by me about the operation and/or procedure(s) which I have consented to have been fully answered in a satisfactory manner.                               _______________________           4/17/19                            Witness     Signature Of Patient         Date        Kelsey Valderrama                                                 Informing Physician                                           Signature of Informing Physician                              If patient is unable to sign or is a minor, complete one of the following:    (A)  Patient is a minor  years of age. (B)  Patient is unable to sign because: The undersigned represents that he or she is duly authorized to execute this consent for and on behalf of the above named patient.               Witness o  Parent  o  Guardian   o  Spouse       o  Other (specify)                                   Patient Name: Moose Linotn  Patient YOB: 1960  Dr. Chani Thornton' Return To Work Policy  Regarding your ability to return to work after surgery or injury, Dr. Chani Thornton will not state that any patient is off of work or cannot work at all. He will place you on restrictions after your surgical procedure or injury. This means that you will be allowed to return to work the day after your office visit or surgery with restrictions. Depending on the details of your particular situation, Dr. Chani Thornton may state that you will have either light use or no use of your hand for a specific number of weeks. It is your obligation to communicate with your employer regarding your restrictions. It is your employer's decision as to whether they will accommodate your restrictions (i.e. allow you to come to work in your restricted capacity) or to not allow you to return to work under your restrictions. Dr. Chani Thornton does not participate in making this decision and cannot influence your employer regarding their decision. If you do not communicate your restrictions to your employer, or if you do not present to work as you are scheduled to, Dr. Chani Thornton will not provide an 'excuse' to explain your absence. A doctors note, or official forms (BWC, FMLA, etc.) will be filled out, upon request, to indicate your date of surgery and your restrictions as stated above. Dr. Chani Thornton' Narcotic Policy  Patients will only be prescribed narcotics after surgical procedures or significant injury. Not all procedures cause pain great enough to require Narcotics and thus, not all patients will receive prescriptions after surgical procedures or injuries. Narcotics are never prescribed for chronic conditions. Narcotics are never prescribed for use longer than one week at a time.  Refills are only granted in unusual circumstances and only at Dr. Emanuel Apt discretion. Patients who are receiving narcotic medication from another physician or who are under pain management contracts will not be given a prescription for narcotics for any reason. I have read the above policies and understand that by agreeing to proceed with treatment by Dr. Brittani Christine and his team, that I am agreeing to abide by these policies.     Patient Name:  Kaushik Martinez    Patient Signature:  _____________________________    Geronimo Garvey Date:   4/17/19

## 2019-04-30 ENCOUNTER — OFFICE VISIT (OUTPATIENT)
Dept: INTERNAL MEDICINE CLINIC | Age: 59
End: 2019-04-30
Payer: COMMERCIAL

## 2019-04-30 VITALS
DIASTOLIC BLOOD PRESSURE: 70 MMHG | WEIGHT: 246 LBS | SYSTOLIC BLOOD PRESSURE: 146 MMHG | OXYGEN SATURATION: 97 % | HEART RATE: 71 BPM | BODY MASS INDEX: 38.53 KG/M2

## 2019-04-30 DIAGNOSIS — Z01.818 PRE-OP EXAM: Primary | ICD-10-CM

## 2019-04-30 LAB
A/G RATIO: 1.9 (ref 1.1–2.2)
ALBUMIN SERPL-MCNC: 4.6 G/DL (ref 3.4–5)
ALP BLD-CCNC: 83 U/L (ref 40–129)
ALT SERPL-CCNC: 15 U/L (ref 10–40)
ANION GAP SERPL CALCULATED.3IONS-SCNC: 12 MMOL/L (ref 3–16)
AST SERPL-CCNC: 12 U/L (ref 15–37)
BASOPHILS ABSOLUTE: 0 K/UL (ref 0–0.2)
BASOPHILS RELATIVE PERCENT: 0.7 %
BILIRUB SERPL-MCNC: 0.4 MG/DL (ref 0–1)
BUN BLDV-MCNC: 15 MG/DL (ref 7–20)
CALCIUM SERPL-MCNC: 9.3 MG/DL (ref 8.3–10.6)
CHLORIDE BLD-SCNC: 103 MMOL/L (ref 99–110)
CO2: 25 MMOL/L (ref 21–32)
CREAT SERPL-MCNC: 1.2 MG/DL (ref 0.8–1.3)
EOSINOPHILS ABSOLUTE: 0.3 K/UL (ref 0–0.6)
EOSINOPHILS RELATIVE PERCENT: 5.6 %
GFR AFRICAN AMERICAN: >60
GFR NON-AFRICAN AMERICAN: >60
GLOBULIN: 2.4 G/DL
GLUCOSE BLD-MCNC: 90 MG/DL (ref 70–99)
HCT VFR BLD CALC: 41.3 % (ref 40.5–52.5)
HEMOGLOBIN: 13.7 G/DL (ref 13.5–17.5)
LYMPHOCYTES ABSOLUTE: 2.1 K/UL (ref 1–5.1)
LYMPHOCYTES RELATIVE PERCENT: 35 %
MCH RBC QN AUTO: 30.1 PG (ref 26–34)
MCHC RBC AUTO-ENTMCNC: 33.2 G/DL (ref 31–36)
MCV RBC AUTO: 90.8 FL (ref 80–100)
MONOCYTES ABSOLUTE: 0.6 K/UL (ref 0–1.3)
MONOCYTES RELATIVE PERCENT: 10.8 %
NEUTROPHILS ABSOLUTE: 2.9 K/UL (ref 1.7–7.7)
NEUTROPHILS RELATIVE PERCENT: 47.9 %
PDW BLD-RTO: 14.5 % (ref 12.4–15.4)
PLATELET # BLD: 228 K/UL (ref 135–450)
PMV BLD AUTO: 9.3 FL (ref 5–10.5)
POTASSIUM SERPL-SCNC: 4.4 MMOL/L (ref 3.5–5.1)
RBC # BLD: 4.55 M/UL (ref 4.2–5.9)
SODIUM BLD-SCNC: 140 MMOL/L (ref 136–145)
TOTAL PROTEIN: 7 G/DL (ref 6.4–8.2)
WBC # BLD: 6 K/UL (ref 4–11)

## 2019-04-30 PROCEDURE — 99213 OFFICE O/P EST LOW 20 MIN: CPT | Performed by: NURSE PRACTITIONER

## 2019-04-30 PROCEDURE — 93000 ELECTROCARDIOGRAM COMPLETE: CPT | Performed by: NURSE PRACTITIONER

## 2019-04-30 NOTE — PROGRESS NOTES
Presents today for pre op exam   Subjective:      Nael Child is a 59 y.o. male who presents to the office today for a preoperative consultation at the request of surgeon Dr. Danna Forde who plans on performing right trigger finger repair on May 7. This consultation is requested for the specific conditions prompting preoperative evaluation (i.e. because of potential affect on operative risk): Diabetes type II. Planned anesthesia is General.  The patient has the following known anesthesia issues: none  Patient has a bleeding risk of : no recent abnormal bleeding  Patient does not have objection to receiving blood products if needed. Patient's medications, allergies, past medical, surgical, social and family histories were reviewed and updated as appropriate. Review of Systems  Pertinent items are noted in HPI.       Objective:      BP (!) 146/70   Pulse 71   Wt 246 lb (111.6 kg)   SpO2 97%   BMI 38.53 kg/m²     General Appearance:  Alert, cooperative, no distress, appears stated age   Head:  Normocephalic, without obvious abnormality, atraumatic   Eyes:  PERRL, conjunctiva/corneas clear, EOM's intact, fundi benign, both eyes   Ears:  Normal TM's and external ear canals, both ears   Nose: Nares normal, septum midline, mucosa normal, no drainage or sinus tenderness   Throat: Lips, mucosa, and tongue normal; teeth and gums normal   Neck: Supple, symmetrical, trachea midline, no adenopathy, thyroid: not enlarged, symmetric, no tenderness/mass/nodules, no carotid bruit or JVD   Back:   Symmetric, no curvature, ROM normal, no CVA tenderness   Lungs:   Clear to auscultation bilaterally, respirations unlabored   Chest Wall:  No tenderness or deformity   Heart:  Regular rate and rhythm, S1, S2 normal, no murmur, rub or gallop   Abdomen:   Soft, non-tender, bowel sounds active all four quadrants,  no masses, no organomegaly   Genitalia:  Normal male   Rectal:  Normal tone, normal prostate, no masses or tenderness;  guaiac negative stool   Extremities: Extremities normal, atraumatic, no cyanosis or edema   Pulses: 2+ and symmetric   Skin: Skin color, texture, turgor normal, no rashes or lesions   Lymph nodes: Cervical, supraclavicular, and axillary nodes normal   Neurologic: Normal         Predictors of intubation difficulty:   Morbid obesity? no   Anatomically abnormal facies? no   Prominent incisors? no   Receding mandible? no   Short, thick neck? no   Neck range of motion: normal   Mallampati score: I (soft palate, uvula, fauces, tonsillar pillars visible)   Thyromental distance: < 6cm   Mouth openincm   Dentition: upper partial    Cardiographics  ECG: normal sinus rhythm, no blocks or conduction defects, no ischemic changes  Echocardiogram: not indicated    Imaging  Chest X-Ray: not indicated     Lab Review   Hospital Outpatient Visit on 2019   Component Date Value    Vit D, 1,25-Dihydroxy 2019 39.0     Vit D, 25-Hydroxy 2019 30.5    Hospital Outpatient Visit on 2019   Component Date Value    Hemoglobin A1C 2019 6.2     eAG 2019 131.2     Microalbumin, Random Uri* 2019 4.20*    Creatinine, Ur 2019 97.6     Microalbumin Creatinine * 2019 43.0*         Assessment:        59 y.o. male with planned surgery as above. Known risk factors for perioperative complications: Diabetes mellitus    Difficulty with intubation is not anticipated. Cardiac Risk Estimation: per the Revised Cardiac Risk Index (Circ. 100:1043, 1999), the patient's risk factors for cardiac complications include none, putting him in: RCI RISK CLASS I (0 risk factors, risk of major cardiac compl. appr. 0.5%)    Current medications which may produce withdrawal symptoms if withheld perioperatively: none       Plan:      1. Preoperative workup as follows ECG, electrolytes  2. Change in medication regimen before surgery: ASA, celebrex and vitamin discontinued 4 days ago  3.  Prophylaxis for cardiac events with perioperative beta-blockers: should be considered, specific regimen per anesthesia  4. Invasive hemodynamic monitoring perioperatively: at the discretion of anesthesiologist  5. Deep vein thrombosis prophylaxis postoperatively:regimen to be chosen by surgical team  6. Surveillance for postoperative MI with ECG immediately postoperatively and on postoperative days 1 and 2 AND troponin levels 24 hours postoperatively and on day 4 or hospital discharge (whichever comes first): at the discretion of anesthesiologist  7.  Other measures: none

## 2019-05-03 NOTE — PROGRESS NOTES
[address] [facility] time____0815________        Surgery time____0945________    Take the following medications with a sip of water:  Amlodipine and losartan    Do not eat or drink anything after 12:00 midnight prior to your surgery. This includes water chewing gum, mints and ice chips. You may brush your teeth and gargle the morning of your surgery, but do not swallow the water     Please see your family doctor/pediatrician for a history and physical and/or concerning medications. Bring any test results/reports from your physicians office. If you are under the care of a heart doctor or specialist doctor, please be aware that you may be asked to them for clearance    You may be asked to stop blood thinners such as Coumadin, Plavix, Fragmin, Lovenox, etc., or any anti-inflammatories such as:  Aspirin, Ibuprofen, Advil, Naproxen prior to your surgery. We also ask that you stop any OTC medications such as fish oil, vitamin E, glucosamine, garlic, Multivitamins, COQ 10, etc.    We ask that you do not smoke 24 hours prior to surgery  We ask that you do not  drink any alcoholic beverages 24 hours prior to surgery     You must make arrangements for a responsible adult to take you home after your surgery. For your safety you will not be allowed to leave alone or drive yourself home. Your surgery will be cancelled if you do not have a ride home. Also for your safety, it is strongly suggested that someone stay with you the first 24 hours after your surgery. A parent or legal guardian must accompany a child scheduled for surgery and plan to stay at the hospital until the child is discharged. Please do not bring other children with you. For your comfort, please wear simple loose fitting clothing to the hospital.  Please do not bring valuables.     Do not wear any make-up or nail polish on your fingers or toes      For your safety, please do not wear any jewelry

## 2019-05-03 NOTE — PROGRESS NOTES
C-Difficile admission screening and protocol:     * Admitted with diarrhea? YES____    NO___X__     *Prior history of C-Diff. In last 3 months? YES____   NO__X___     *Antibiotic use in the past 6-8 weeks? NO___X___YES______                 If yes which  ANTIBIOTIC AND REASON______     *Prior hospitalization or nursing home in the last month?  YES____   NOX___

## 2019-05-06 ENCOUNTER — ANESTHESIA EVENT (OUTPATIENT)
Dept: OPERATING ROOM | Age: 59
End: 2019-05-06
Payer: COMMERCIAL

## 2019-05-07 ENCOUNTER — ANESTHESIA (OUTPATIENT)
Dept: OPERATING ROOM | Age: 59
End: 2019-05-07
Payer: COMMERCIAL

## 2019-05-07 ENCOUNTER — HOSPITAL ENCOUNTER (OUTPATIENT)
Age: 59
Setting detail: OUTPATIENT SURGERY
Discharge: HOME | End: 2019-05-07
Attending: ORTHOPAEDIC SURGERY | Admitting: ORTHOPAEDIC SURGERY
Payer: COMMERCIAL

## 2019-05-07 VITALS
HEART RATE: 56 BPM | RESPIRATION RATE: 16 BRPM | HEIGHT: 67 IN | BODY MASS INDEX: 37.96 KG/M2 | OXYGEN SATURATION: 97 % | DIASTOLIC BLOOD PRESSURE: 79 MMHG | WEIGHT: 241.84 LBS | SYSTOLIC BLOOD PRESSURE: 142 MMHG | TEMPERATURE: 96.9 F

## 2019-05-07 VITALS
DIASTOLIC BLOOD PRESSURE: 72 MMHG | OXYGEN SATURATION: 97 % | RESPIRATION RATE: 1 BRPM | SYSTOLIC BLOOD PRESSURE: 119 MMHG

## 2019-05-07 LAB
GLUCOSE BLD-MCNC: 104 MG/DL (ref 70–99)
GLUCOSE BLD-MCNC: 93 MG/DL (ref 70–99)
PERFORMED ON: ABNORMAL
PERFORMED ON: NORMAL

## 2019-05-07 PROCEDURE — 3700000000 HC ANESTHESIA ATTENDED CARE: Performed by: ORTHOPAEDIC SURGERY

## 2019-05-07 PROCEDURE — 2580000003 HC RX 258: Performed by: ANESTHESIOLOGY

## 2019-05-07 PROCEDURE — 7100000000 HC PACU RECOVERY - FIRST 15 MIN: Performed by: ORTHOPAEDIC SURGERY

## 2019-05-07 PROCEDURE — 3600000015 HC SURGERY LEVEL 5 ADDTL 15MIN: Performed by: ORTHOPAEDIC SURGERY

## 2019-05-07 PROCEDURE — 7100000010 HC PHASE II RECOVERY - FIRST 15 MIN: Performed by: ORTHOPAEDIC SURGERY

## 2019-05-07 PROCEDURE — 2500000003 HC RX 250 WO HCPCS: Performed by: ORTHOPAEDIC SURGERY

## 2019-05-07 PROCEDURE — 7100000001 HC PACU RECOVERY - ADDTL 15 MIN: Performed by: ORTHOPAEDIC SURGERY

## 2019-05-07 PROCEDURE — 3600000005 HC SURGERY LEVEL 5 BASE: Performed by: ORTHOPAEDIC SURGERY

## 2019-05-07 PROCEDURE — 2500000003 HC RX 250 WO HCPCS: Performed by: NURSE ANESTHETIST, CERTIFIED REGISTERED

## 2019-05-07 PROCEDURE — 7100000011 HC PHASE II RECOVERY - ADDTL 15 MIN: Performed by: ORTHOPAEDIC SURGERY

## 2019-05-07 PROCEDURE — 2500000003 HC RX 250 WO HCPCS: Performed by: ANESTHESIOLOGY

## 2019-05-07 PROCEDURE — 6360000002 HC RX W HCPCS: Performed by: NURSE ANESTHETIST, CERTIFIED REGISTERED

## 2019-05-07 PROCEDURE — 2709999900 HC NON-CHARGEABLE SUPPLY: Performed by: ORTHOPAEDIC SURGERY

## 2019-05-07 PROCEDURE — 3700000001 HC ADD 15 MINUTES (ANESTHESIA): Performed by: ORTHOPAEDIC SURGERY

## 2019-05-07 RX ORDER — OXYCODONE HYDROCHLORIDE 5 MG/1
5 TABLET ORAL PRN
Status: DISCONTINUED | OUTPATIENT
Start: 2019-05-07 | End: 2019-05-07 | Stop reason: HOSPADM

## 2019-05-07 RX ORDER — FENTANYL CITRATE 50 UG/ML
25 INJECTION, SOLUTION INTRAMUSCULAR; INTRAVENOUS EVERY 5 MIN PRN
Status: DISCONTINUED | OUTPATIENT
Start: 2019-05-07 | End: 2019-05-07 | Stop reason: HOSPADM

## 2019-05-07 RX ORDER — OXYCODONE HYDROCHLORIDE 5 MG/1
10 TABLET ORAL PRN
Status: DISCONTINUED | OUTPATIENT
Start: 2019-05-07 | End: 2019-05-07 | Stop reason: HOSPADM

## 2019-05-07 RX ORDER — SODIUM CHLORIDE 9 MG/ML
INJECTION, SOLUTION INTRAVENOUS CONTINUOUS
Status: DISCONTINUED | OUTPATIENT
Start: 2019-05-07 | End: 2019-05-07 | Stop reason: HOSPADM

## 2019-05-07 RX ORDER — PROPOFOL 10 MG/ML
INJECTION, EMULSION INTRAVENOUS CONTINUOUS PRN
Status: DISCONTINUED | OUTPATIENT
Start: 2019-05-07 | End: 2019-05-07 | Stop reason: SDUPTHER

## 2019-05-07 RX ORDER — FENTANYL CITRATE 50 UG/ML
50 INJECTION, SOLUTION INTRAMUSCULAR; INTRAVENOUS EVERY 5 MIN PRN
Status: DISCONTINUED | OUTPATIENT
Start: 2019-05-07 | End: 2019-05-07 | Stop reason: HOSPADM

## 2019-05-07 RX ORDER — MORPHINE SULFATE 2 MG/ML
2 INJECTION, SOLUTION INTRAMUSCULAR; INTRAVENOUS EVERY 5 MIN PRN
Status: DISCONTINUED | OUTPATIENT
Start: 2019-05-07 | End: 2019-05-07 | Stop reason: HOSPADM

## 2019-05-07 RX ORDER — ONDANSETRON 2 MG/ML
4 INJECTION INTRAMUSCULAR; INTRAVENOUS
Status: DISCONTINUED | OUTPATIENT
Start: 2019-05-07 | End: 2019-05-07 | Stop reason: HOSPADM

## 2019-05-07 RX ORDER — MORPHINE SULFATE 2 MG/ML
1 INJECTION, SOLUTION INTRAMUSCULAR; INTRAVENOUS EVERY 5 MIN PRN
Status: DISCONTINUED | OUTPATIENT
Start: 2019-05-07 | End: 2019-05-07 | Stop reason: HOSPADM

## 2019-05-07 RX ORDER — SODIUM CHLORIDE 0.9 % (FLUSH) 0.9 %
10 SYRINGE (ML) INJECTION EVERY 12 HOURS SCHEDULED
Status: DISCONTINUED | OUTPATIENT
Start: 2019-05-07 | End: 2019-05-07 | Stop reason: HOSPADM

## 2019-05-07 RX ORDER — MEPERIDINE HYDROCHLORIDE 25 MG/ML
12.5 INJECTION INTRAMUSCULAR; INTRAVENOUS; SUBCUTANEOUS EVERY 5 MIN PRN
Status: DISCONTINUED | OUTPATIENT
Start: 2019-05-07 | End: 2019-05-07 | Stop reason: HOSPADM

## 2019-05-07 RX ORDER — PROPOFOL 10 MG/ML
INJECTION, EMULSION INTRAVENOUS PRN
Status: DISCONTINUED | OUTPATIENT
Start: 2019-05-07 | End: 2019-05-07 | Stop reason: SDUPTHER

## 2019-05-07 RX ORDER — GLYCOPYRROLATE 0.2 MG/ML
INJECTION INTRAMUSCULAR; INTRAVENOUS PRN
Status: DISCONTINUED | OUTPATIENT
Start: 2019-05-07 | End: 2019-05-07 | Stop reason: SDUPTHER

## 2019-05-07 RX ORDER — SODIUM CHLORIDE 0.9 % (FLUSH) 0.9 %
10 SYRINGE (ML) INJECTION PRN
Status: DISCONTINUED | OUTPATIENT
Start: 2019-05-07 | End: 2019-05-07 | Stop reason: HOSPADM

## 2019-05-07 RX ORDER — LIDOCAINE HYDROCHLORIDE 20 MG/ML
INJECTION, SOLUTION EPIDURAL; INFILTRATION; INTRACAUDAL; PERINEURAL PRN
Status: DISCONTINUED | OUTPATIENT
Start: 2019-05-07 | End: 2019-05-07 | Stop reason: SDUPTHER

## 2019-05-07 RX ADMIN — SODIUM CHLORIDE: 9 INJECTION, SOLUTION INTRAVENOUS at 09:30

## 2019-05-07 RX ADMIN — GLYCOPYRROLATE 0.1 MG: 0.2 INJECTION, SOLUTION INTRAMUSCULAR; INTRAVENOUS at 09:31

## 2019-05-07 RX ADMIN — LIDOCAINE HYDROCHLORIDE 5 ML: 20 INJECTION, SOLUTION EPIDURAL; INFILTRATION; INTRACAUDAL; PERINEURAL at 09:31

## 2019-05-07 RX ADMIN — PROPOFOL 180 MCG/KG/MIN: 10 INJECTION, EMULSION INTRAVENOUS at 09:31

## 2019-05-07 RX ADMIN — PROPOFOL 100 MG: 10 INJECTION, EMULSION INTRAVENOUS at 09:31

## 2019-05-07 RX ADMIN — SODIUM CHLORIDE: 9 INJECTION, SOLUTION INTRAVENOUS at 08:10

## 2019-05-07 RX ADMIN — FAMOTIDINE 20 MG: 10 INJECTION, SOLUTION INTRAVENOUS at 08:12

## 2019-05-07 ASSESSMENT — PAIN SCALES - GENERAL
PAINLEVEL_OUTOF10: 0

## 2019-05-07 ASSESSMENT — PULMONARY FUNCTION TESTS
PIF_VALUE: 0

## 2019-05-07 ASSESSMENT — LIFESTYLE VARIABLES: SMOKING_STATUS: 0

## 2019-05-07 ASSESSMENT — PAIN - FUNCTIONAL ASSESSMENT: PAIN_FUNCTIONAL_ASSESSMENT: 0-10

## 2019-05-07 ASSESSMENT — ENCOUNTER SYMPTOMS: SHORTNESS OF BREATH: 0

## 2019-05-07 NOTE — ANESTHESIA PRE PROCEDURE
Department of Anesthesiology  Preprocedure Note       Name:  Nael Child   Age:  59 y.o.  :  1960                                          MRN:  [id number]         Date:  2019      Surgeon: Makeda Ellsworth):  Amelie Rodriguez MD    Procedure: RIGHT THUMB TRIGGER FINGER RELEASE (Right )    Medications prior to admission:   Prior to Admission medications    Medication Sig Start Date End Date Taking? Authorizing Provider   Dulaglutide (TRULICITY) 2.15 JS/0.5TN SOPN INJECT 0.75MG INTO THE SKIN ONCE WEEKLY  Patient taking differently: INJECT 0.75MG INTO THE SKIN ONCE WEEKLY ON 19  Yes Truong Manzo MD   celecoxib (CELEBREX) 200 MG capsule Take 1 capsule by mouth daily 19  Yes Truong Manzo MD   amLODIPine (NORVASC) 5 MG tablet TAKE 1 TABLET BY MOUTH EVERY DAY 19  Yes Truong Manzo MD   atorvastatin (LIPITOR) 20 MG tablet TAKE 1 TABLET BY MOUTH DAILY 19  Yes Truong Manzo MD   losartan (COZAAR) 100 MG tablet TAKE 1 TABLET BY MOUTH DAILY 19  Yes Truong Manzo MD   metFORMIN (GLUCOPHAGE) 1000 MG tablet TAKE 1 TABLET BY MOUTH TWICE DAILY WITH MEALS 19  Yes Truong Manzo MD   Cholecalciferol (VITAMIN D3) 2000 units CAPS Take 1 capsule by mouth daily   Yes Historical Provider, MD   triamcinolone (KENALOG) 0.1 % cream Apply topically 2 times daily. 18  Yes Truong Manzo MD   aspirin 81 MG EC tablet Take 81 mg by mouth daily. Yes Historical Provider, MD   Blood Glucose Monitoring Suppl (ONE TOUCH ULTRA 2) w/Device KIT Test 3 times a day & as needed for symptoms of irregular blood glucose.  19   Truong Manzo MD   ONE TOUCH ULTRA TEST strip USE TO TEST ONCE DAILY AS NEEDED 18   MD Melissa Goode LANCETS 77N MISC USE THREE TIMES DAILY 18   Truong Manzo MD       Current medications:    Current Facility-Administered Medications   Medication Dose Route Frequency Provider Last Rate Last Dose    0.9 % sodium chloride infusion Intravenous Continuous Rafiq Muñiz MD 75 mL/hr at 05/07/19 0810      sodium chloride flush 0.9 % injection 10 mL  10 mL Intravenous 2 times per day Rafiq Muñiz MD        sodium chloride flush 0.9 % injection 10 mL  10 mL Intravenous PRN Rafiq Muñiz MD        famotidine (PEPCID) injection 20 mg  20 mg Intravenous Once Rafiq Muñiz MD           Allergies:     Allergies   Allergen Reactions    Pcn [Penicillins] Hives and Swelling       Problem List:    Patient Active Problem List   Diagnosis Code    Obstructive sleep apnea syndrome G47.33    Diabetes mellitus with coincident hypertension ([address] E11.9, I10    Morbid obesity due to excess calories ([address] E66.01    Trigger ring finger of right hand M65.341    Controlled type 2 diabetes mellitus without complication, without long-term current use of insulin ([facility]) E11.9    Trigger finger of right thumb M65.311       Past Medical History:        Diagnosis Date    Allergic rhinitis     Arthritis     Carpal tunnel syndrome 12/24/2014    Cervical disc disease     CADY    Colon polyps 2016    Diabetes mellitus ([address]     Dysfunction of eustachian tube 2/27/2012    Hyperlipidemia     Hypertension     Morbid obesity due to excess calories ([address] 4/4/2017    Simple chronic serous otitis media 2/27/2012    Sleep apnea     Trigger finger of right thumb 9/12/2017    Trigger finger, acquired 6/11/2014    Trigger ring finger of right hand 12/5/2017    Uncontrolled type 2 diabetes mellitus without complication, without long-term current use of insulin ([address] 4/4/2017    Wears glasses        Past Surgical History:        Procedure Laterality Date    BUNIONECTOMY Bilateral     FINGER TRIGGER RELEASE Right 12/26/2017    KNEE SURGERY Right     Arthroscopy    NASAL SEPTUM SURGERY  2005    TYMPANOSTOMY TUBE PLACEMENT         Social History:    Social History     Tobacco Use    Smoking status: Never Smoker    Smokeless tobacco: Never Used   Substance Use Topics    Alcohol use: No                                Counseling given: Not Answered      Vital Signs (Current):   Vitals:    05/03/19 1601 05/07/19 0754   BP:  (!) 140/75   Pulse:  73   Resp:  14   Temp:  97.3 °F (36.3 °C)   TempSrc:  Temporal   SpO2:  98%   Weight: 246 lb (111.6 kg) 241 lb 13.5 oz (109.7 kg)   Height: 5' 7\" (1.702 m) 5' 7\" (1.702 m)                                              BP Readings from Last 3 Encounters:   05/07/19 (!) 140/75   04/30/19 (!) 146/70   04/17/19 (!) 143/82       NPO Status: Time of last liquid consumption: 2000                        Time of last solid consumption: 2000                        Date of last liquid consumption: 05/06/19                        Date of last solid food consumption: 05/06/19    BMI:   Wt Readings from Last 3 Encounters:   05/07/19 241 lb 13.5 oz (109.7 kg)   04/30/19 246 lb (111.6 kg)   04/17/19 240 lb (108.9 kg)     Body mass index is 37.88 kg/m².     CBC:   Lab Results   Component Value Date    WBC 6.0 04/30/2019    RBC 4.55 04/30/2019    HGB 13.7 04/30/2019    HCT 41.3 04/30/2019    MCV 90.8 04/30/2019    RDW 14.5 04/30/2019     04/30/2019       CMP:   Lab Results   Component Value Date     04/30/2019    K 4.4 04/30/2019     04/30/2019    CO2 25 04/30/2019    BUN 15 04/30/2019    CREATININE 1.2 04/30/2019    GFRAA >60 04/30/2019    AGRATIO 1.9 04/30/2019    LABGLOM >60 04/30/2019    GLUCOSE 90 04/30/2019    PROT 7.0 04/30/2019    CALCIUM 9.3 04/30/2019    BILITOT 0.4 04/30/2019    ALKPHOS 83 04/30/2019    AST 12 04/30/2019    ALT 15 04/30/2019       POC Tests:   Recent Labs     05/07/19  0808   POCGLU 104*       Coags: No results found for: PROTIME, INR, APTT    HCG (If Applicable): No results found for: PREGTESTUR, PREGSERUM, HCG, HCGQUANT     ABGs: No results found for: PHART, PO2ART, SVJ9XTU, NLQ4AXO, BEART, I1UTBCUD     Type & Screen (If Applicable):  No results found for: LABABO, [address]    Anesthesia Evaluation  Patient summary reviewed and Nursing notes reviewed no history of anesthetic complications:   Airway: Mallampati: III  TM distance: >3 FB   Neck ROM: full  Mouth opening: > = 3 FB Dental:          Pulmonary:   (+) sleep apnea:      (-) pneumonia, COPD, asthma, shortness of breath, recent URI and not a current smoker                           Cardiovascular:  Exercise tolerance: good (>4 METS),   (+) hypertension:,     (-) pacemaker, valvular problems/murmurs, past MI, CAD, CABG/stent, dysrhythmias,  angina,  CHF, orthopnea, PND and  PAREKH      Rhythm: regular                      Neuro/Psych:   (+) neuromuscular disease:,    (-) seizures, TIA, CVA, headaches, psychiatric history and depression/anxiety            GI/Hepatic/Renal:        (-) hiatal hernia, GERD, PUD, hepatitis, liver disease, no renal disease, bowel prep and no morbid obesity       Endo/Other:    (+) Diabetes, no malignancy/cancer. (-) hypothyroidism, hyperthyroidism, blood dyscrasia, arthritis, no electrolyte abnormalities, no malignancy/cancer               Abdominal:           Vascular: negative vascular ROS. Anesthesia Plan      MAC     ASA 3       Induction: intravenous. Anesthetic plan and risks discussed with patient and spouse. Plan discussed with CRNA. Ponce Aguirre MD   5/7/2019  This pre-anesthesia assessment may be used as a history and physical.    DOS STAFF ADDENDUM:    Pt seen and examined, chart reviewed (including anesthesia, drug and allergy history). No interval changes to history and physical examination. Anesthetic plan, risks, benefits, alternatives, and personnel involved discussed with patient. Patient verbalized an understanding and agrees to proceed.       Ponce Aguirre MD  May 7, 2019  8:12 AM

## 2019-05-07 NOTE — OP NOTE
OPERATIVE REPORT              . Patient:  Brooks Jones    YOB: 1960  Date of Service:  5/7/2019  Location:  [facility]        Preoperative Diagnosis:  Right Thumb trigger finger. Postoperative Diagnosis: Same. Procedure: Right Thumb trigger finger release (A1 pulley release). Surgeon:    Carmen Romero MD    Surgical Assistant:    KEVEN Centeno Assistant    Anesthesia:  Local with sedation. Blood Loss:  Minimal.     Complications:  None. Tourniquet Time:  2 minutes. Indications:  Mr. Parekh  is a 59y.o.  year old male with a Right Thumb trigger finger. I have discussed preoperatively with him the complications, limitations, expectations, alternatives and risks of the planned surgical care. He has voiced an understanding of our discussion. All of his questions have been fully answered to his satisfaction, and he has provided written informed consent to proceed. Procedure:  After written consent was obtained and the proper operative site was identified and marked, Mr. Parekh  was brought to the operating room, placed in the supine position on the operating room table with the Right arm extended upon a hand table. Under an appropriate level of sedation, local anesthetic (1% Lidocaine and 1/2% Marcaine both without Epinephrine) was instilled in the planned surgical field. His Right upper extremity was prepped and draped in the usual sterile fashion. After Eshmarch exsanguination the pneumo-tourniquet was inflated to 250 milimeters of mercury. A 1 centimeter oblique incision was fashioned over the base of the flexor tendon sheath of the Right Thumb. Dissection was carried carefully through the subcutaneous tissues, taking great care to identify and protect the neurovascular structures. The flexor tendon sheath was carefully identified and cleared of surrounding soft tissue.  The A1 pulley was identified and incised longitudinally along its entire length under direct visualization. The flexor tendons were gently withdrawn from the sheath and inspected. They were found to be in good condition. The tendons were returned to their appropriate location and the finger was placed through a full range of motion. There was no evidence of residual stenosis or triggering. The wound was irrigated copiously with sterile saline for irrigation and the pneumo-tourniquet was deflated after a period of 2 minutes elevation. Hemostasis was easily obtained with direct pressure and electrocautery. The wound was closed with interrupted sutures in the skin. The wound was dressed with adaptic, dry sterile gauze, and a bulky, hand based dressing was applied. Mr. Annie Simms  was awakened from light sedation, having tolerated the procedure without apparent complication, and was returned to the recovery room in stable condition. At the conclusion of the procedure all needle, instrument, and sponge counts were correct. Sarah Butler MD   5/7/2019, 9:44 AM

## 2019-05-07 NOTE — PROGRESS NOTES
Pt alert. Denies pain or discomfort at present. Bends and straightens right fingers as directed. Little numbness in right hand. VSS. Up to chair. Elevated right hand. Given soda and cookies. Wife to room.

## 2019-05-07 NOTE — ANESTHESIA POSTPROCEDURE EVALUATION
Department of Anesthesiology  Postprocedure Note    Patient: Wendy Small  MRN: [id number]  YOB: 1960  Date of evaluation: 5/7/2019  Time:  2:02 PM     Procedure Summary     Date:  05/07/19 Room / Location:  [facility] OR 03 / [facility] OR    Anesthesia Start:  0930 Anesthesia Stop:  8798    Procedure:  RIGHT THUMB TRIGGER FINGER RELEASE (Right ) Diagnosis:       Trigger finger of right thumb      (RIGHT THUMB TRIGGER FINGER)    Surgeon:  Kierra Molina MD Responsible Provider:  Hardeep Gomez MD    Anesthesia Type:  MAC ASA Status:  3          Anesthesia Type: MAC    Rylee Phase I: Rylee Score: 10    Rylee Phase II: Rylee Score: 10    Last vitals: Reviewed and per EMR flowsheets.        Anesthesia Post Evaluation    Patient location during evaluation: PACU  Patient participation: complete - patient participated  Level of consciousness: awake and alert  Pain score: 0  Airway patency: patent  Nausea & Vomiting: no nausea and no vomiting  Complications: no  Cardiovascular status: blood pressure returned to baseline  Respiratory status: acceptable  Hydration status: euvolemic

## 2019-05-07 NOTE — PROGRESS NOTES
Pt arrived to pacu sleeping, vss, iv infusing, R wrist dressing clean, dry and intact, brisk cap refill noted to R fingers and R radial pulses palpable.

## 2019-05-07 NOTE — PROGRESS NOTES
Tolerates PO and sitting up in chair. VSS. Urge to void. IV d/c'd. Pt dressing with wife's assistance. Called  for car.

## 2019-05-07 NOTE — H&P
Pre-operative Update of H&P:    I  have seen & examined Mr. Christopher Sanon related solely to his hand and upper extremity conditions, prior to the scheduled procedure on the date of his surgery. The indications for the planned surgical procedure & and his upper-extremity conditionare unchanged. Please see the Anesthesia Pre-Op Note from date of surgery for Mr. Christopher Sanon's systemic evaluation.

## 2019-05-13 ENCOUNTER — OFFICE VISIT (OUTPATIENT)
Dept: ORTHOPEDIC SURGERY | Age: 59
End: 2019-05-13

## 2019-05-13 VITALS — HEIGHT: 67 IN | WEIGHT: 241 LBS | BODY MASS INDEX: 37.83 KG/M2 | RESPIRATION RATE: 16 BRPM

## 2019-05-13 DIAGNOSIS — M65.311 TRIGGER FINGER OF RIGHT THUMB: Primary | ICD-10-CM

## 2019-05-13 PROCEDURE — 99024 POSTOP FOLLOW-UP VISIT: CPT | Performed by: ORTHOPAEDIC SURGERY

## 2019-05-13 NOTE — PROGRESS NOTES
Mr. Jessica Mast returns today in follow-up of his recent right Thumb A1 Pulley (Trigger Finger) Release done approximately 1 week ago. He has done well noting mild discomfort and no other reported complications. He notes pre-operative symptoms to be Fully Resolved at this time. Physical Exam:  Skin incision is healing well, no significant drainage, no significant erythema. Digital range of motion is mildly stiff  in the Thumb, normal in all other digits. Wrist range of motion is equal bilateral.  Sensation is normal in the Thumb. Vascular examination reveals normal and good capillary refill. Swelling is mild. His preoperative triggering is Fully Resolved. Impression:  Mr. Jessica Mast is doing well after recent right Thumb Trigger Finger Release. Plan:  Mr. Jessica Mast is instructed in work on Active & Passive range of motion of the digits, wrist, & elbow. These modalities were specifically demonstrated to him today. We discussed the appropriateness of gradual resumption of use of the operated hand and the return to normal use as comfort allows. He is given instructions regarding management of the fresh surgical incision and progressive use of desensitization and tissue massage techniques. We discussed the appropriate expectations and timeline for symptom improvement. He is provided a written patient instruction sheet titled: Postoperative Instructions After Trigger Finger Release. I have asked Mr. Jessica Mast to follow-up with me or contact me by telephone over the next 2-4 weeks if his symptoms have not fully resolved or if he has not regained full & painless return of function. He is also specifically instructed to return to the office or call for an appointment sooner if his symptoms are changing or worsening prior to that time.

## 2019-05-13 NOTE — Clinical Note
Dear  Jacinto Busch MD,Thank you very much for your referral or Mr. Wendy Small to me for evaluation and treatment of his Hand & Wrist condition. I appreciate your confidence in me and thank you for allowing me the opportunity to care for your patients. If I can be of any further assistance to you on this or any other patient, please do not hesitate to contact me. Sincerely,Babar Ch MD

## 2019-05-13 NOTE — PATIENT INSTRUCTIONS
Postoperative Instructions After Trigger Finger Release    Dr. Kay Valderrama          1. After bandages are removed one week from surgery, you may chose to wear a small bandage over the incision if you wish, though you do not need to. 2. Keep incision dry until sutures have fully dissolved  or it has been 14 days since your surgery. Thereafter, you may wash with mild soap and water and shower normally. 3. Once your stiches have fully disappeared & skin appears normal, you should begin gently massaging the incision with Vitamin E (may use Vitamin E lotion or contents of Vitamin E capsule). 4. Work hard on motion of the fingers and wrist, straightening each finger fully and bending each finger fully, bending wrist forward and bending wrist backwards. Do not be concerned if you experience discomfort. This will not damage the surgery. 5. You may begin using the hand as it feels comfortable beginning 12-14 days from the day of surgery. You may not feel entirely comfortable gripping or lifting heavy objects for several weeks. 6. You may expect to see some skin peel off around the incision. You may be left with a small area of pink baby skin. This is quite normal.    Thank you for choosing [facility]) Physicians for your Hand and Upper Extremity needs. If we can be of any further assistance to you, please do not hesitate to contact us.     Office Phone Number:  [phone]  or  [phone]

## 2019-05-29 ENCOUNTER — OFFICE VISIT (OUTPATIENT)
Dept: INTERNAL MEDICINE CLINIC | Age: 59
End: 2019-05-29
Payer: COMMERCIAL

## 2019-05-29 VITALS
WEIGHT: 250 LBS | HEIGHT: 67 IN | DIASTOLIC BLOOD PRESSURE: 86 MMHG | BODY MASS INDEX: 39.24 KG/M2 | HEART RATE: 64 BPM | SYSTOLIC BLOOD PRESSURE: 138 MMHG

## 2019-05-29 DIAGNOSIS — E11.9 DIABETES MELLITUS WITH COINCIDENT HYPERTENSION (HCC): Chronic | ICD-10-CM

## 2019-05-29 DIAGNOSIS — E11.9 CONTROLLED TYPE 2 DIABETES MELLITUS WITHOUT COMPLICATION, WITHOUT LONG-TERM CURRENT USE OF INSULIN (HCC): Primary | ICD-10-CM

## 2019-05-29 DIAGNOSIS — I10 DIABETES MELLITUS WITH COINCIDENT HYPERTENSION (HCC): Chronic | ICD-10-CM

## 2019-05-29 PROCEDURE — 99214 OFFICE O/P EST MOD 30 MIN: CPT | Performed by: INTERNAL MEDICINE

## 2019-05-29 ASSESSMENT — ENCOUNTER SYMPTOMS
SHORTNESS OF BREATH: 0
BACK PAIN: 1

## 2019-05-29 NOTE — PROGRESS NOTES
SUBJECTIVE:  Patient ID: Topher Coates is a 59 y.o. y.o. male     HPI    Topher Coates returns for follow up of hypertension. Patient has been taking His medications as prescribed. Patient's blood pressure is  controlled. Side effects related to taking the medications include no medication side effects noted    Patient returns to the office for follow up of his diabetes. His last hemoglobin A1c was 6.2. She is compliant with his medications but admits to a lot of dietary noncompliance. Patient has no symptoms related to his condition such as blurred vision, slurred speech, chest pain orshortness of breath. Review of Systems   Constitutional: Positive for unexpected weight change. Respiratory: Negative for shortness of breath. Cardiovascular: Negative for chest pain. Musculoskeletal: Positive for arthralgias and back pain (Improved). OBJECTIVE:    /86   Pulse 64   Ht 5' 7\" (1.702 m)   Wt 250 lb (113.4 kg)   BMI 39.16 kg/m²      Physical Exam   Constitutional: He is oriented to person, place, and time. He appears well-developed and well-nourished. No distress. HENT:   Head: Normocephalic and atraumatic. Cardiovascular: Normal rate, regular rhythm, normal heart sounds and intact distal pulses. Exam reveals no gallop and no friction rub. No murmur heard. Pulmonary/Chest: Effort normal and breath sounds normal. No respiratory distress. He has no wheezes. He has no rales. He exhibits no tenderness. Musculoskeletal: He exhibits no edema. No calluses or open lesions   Neurological: He is alert and oriented to person, place, and time. No cranial nerve deficit. Normal sensation to microfilament bilaterally. Skin: Skin is warm. No rash noted. He is not diaphoretic. No erythema. Psychiatric: He has a normal mood and affect. His behavior is normal. Judgment and thought content normal.   Nursing note and vitals reviewed.         Current Outpatient Medications:     metFORMIN (GLUCOPHAGE) 1000 MG tablet, TAKE 1 TABLET BY MOUTH TWICE DAILY WITH MEALS, Disp: 180 tablet, Rfl: 1    Dulaglutide (TRULICITY) 0.94 DR/8.4ZW SOPN, INJECT 0.75MG INTO THE SKIN ONCE WEEKLY (Patient taking differently: INJECT 0.75MG INTO THE SKIN ONCE WEEKLY ON WEDS), Disp: 2 mL, Rfl: 2    Blood Glucose Monitoring Suppl (ONE TOUCH ULTRA 2) w/Device KIT, Test 3 times a day & as needed for symptoms of irregular blood glucose., Disp: 1 kit, Rfl: 0    celecoxib (CELEBREX) 200 MG capsule, Take 1 capsule by mouth daily, Disp: 30 capsule, Rfl: 3    amLODIPine (NORVASC) 5 MG tablet, TAKE 1 TABLET BY MOUTH EVERY DAY, Disp: 90 tablet, Rfl: 0    atorvastatin (LIPITOR) 20 MG tablet, TAKE 1 TABLET BY MOUTH DAILY, Disp: 90 tablet, Rfl: 0    losartan (COZAAR) 100 MG tablet, TAKE 1 TABLET BY MOUTH DAILY, Disp: 90 tablet, Rfl: 0    Cholecalciferol (VITAMIN D3) 2000 units CAPS, Take 1 capsule by mouth daily, Disp: , Rfl:     ONE TOUCH ULTRA TEST strip, USE TO TEST ONCE DAILY AS NEEDED, Disp: 100 strip, Rfl: 3    ONETOUCH [employer]ICA LANCETS 23R MISC, USE THREE TIMES DAILY, Disp: 100 each, Rfl: 5    triamcinolone (KENALOG) 0.1 % cream, Apply topically 2 times daily. , Disp: 453 g, Rfl: 1    aspirin 81 MG EC tablet, Take 81 mg by mouth daily. , Disp: , Rfl:     Assessment/Plan:  Foreign Leblanc was seen today for hypertension and diabetes.     Diagnoses and all orders for this visit:    Controlled type 2 diabetes mellitus without complication, without long-term current use of insulin ([address]  -     [employer] Diabetes Education    Diabetes mellitus with coincident hypertension ([address]  -     metFORMIN (GLUCOPHAGE) 1000 MG tablet; TAKE 1 TABLET BY MOUTH TWICE DAILY WITH MEALS  -     [employer] Diabetes Education        Lab Results   Component Value Date    LABA1C 6.2 02/25/2019     Lab Results   Component Value Date    .2 02/25/2019        Emelia Forde MD

## 2019-06-11 ENCOUNTER — OFFICE VISIT (OUTPATIENT)
Dept: DIABETES SERVICES | Age: 59
End: 2019-06-11

## 2019-06-11 DIAGNOSIS — E11.9 CONTROLLED TYPE 2 DIABETES MELLITUS WITHOUT COMPLICATION, WITHOUT LONG-TERM CURRENT USE OF INSULIN (HCC): Primary | ICD-10-CM

## 2019-07-17 RX ORDER — LOSARTAN POTASSIUM 100 MG/1
TABLET ORAL
Qty: 90 TABLET | Refills: 0 | Status: SHIPPED | OUTPATIENT
Start: 2019-07-17 | End: 2019-10-14 | Stop reason: SDUPTHER

## 2019-07-17 RX ORDER — AMLODIPINE BESYLATE 5 MG/1
TABLET ORAL
Qty: 90 TABLET | Refills: 0 | Status: SHIPPED | OUTPATIENT
Start: 2019-07-17 | End: 2019-10-14 | Stop reason: SDUPTHER

## 2019-08-21 ENCOUNTER — TELEPHONE (OUTPATIENT)
Dept: ADMINISTRATIVE | Age: 59
End: 2019-08-21

## 2019-12-03 ENCOUNTER — OFFICE VISIT (OUTPATIENT)
Dept: INTERNAL MEDICINE CLINIC | Age: 59
End: 2019-12-03
Payer: COMMERCIAL

## 2019-12-03 VITALS
SYSTOLIC BLOOD PRESSURE: 112 MMHG | DIASTOLIC BLOOD PRESSURE: 70 MMHG | HEART RATE: 68 BPM | BODY MASS INDEX: 36.88 KG/M2 | WEIGHT: 235 LBS | HEIGHT: 67 IN

## 2019-12-03 DIAGNOSIS — I10 DIABETES MELLITUS WITH COINCIDENT HYPERTENSION (HCC): ICD-10-CM

## 2019-12-03 DIAGNOSIS — E11.9 CONTROLLED TYPE 2 DIABETES MELLITUS WITHOUT COMPLICATION, WITHOUT LONG-TERM CURRENT USE OF INSULIN (HCC): ICD-10-CM

## 2019-12-03 DIAGNOSIS — E66.01 MORBID OBESITY DUE TO EXCESS CALORIES (HCC): Chronic | ICD-10-CM

## 2019-12-03 DIAGNOSIS — E11.9 DIABETES MELLITUS WITH COINCIDENT HYPERTENSION (HCC): ICD-10-CM

## 2019-12-03 DIAGNOSIS — N40.0 PROSTATE ENLARGEMENT: ICD-10-CM

## 2019-12-03 DIAGNOSIS — E11.9 CONTROLLED TYPE 2 DIABETES MELLITUS WITHOUT COMPLICATION, WITHOUT LONG-TERM CURRENT USE OF INSULIN (HCC): Primary | ICD-10-CM

## 2019-12-03 DIAGNOSIS — J01.00 ACUTE NON-RECURRENT MAXILLARY SINUSITIS: ICD-10-CM

## 2019-12-03 LAB
CHOLESTEROL, TOTAL: 89 MG/DL (ref 0–199)
HDLC SERPL-MCNC: 48 MG/DL (ref 40–60)
LDL CHOLESTEROL CALCULATED: 27 MG/DL
PROSTATE SPECIFIC ANTIGEN: 2.54 NG/ML (ref 0–4)
TRIGL SERPL-MCNC: 71 MG/DL (ref 0–150)
VLDLC SERPL CALC-MCNC: 14 MG/DL

## 2019-12-03 PROCEDURE — 99214 OFFICE O/P EST MOD 30 MIN: CPT | Performed by: INTERNAL MEDICINE

## 2019-12-03 RX ORDER — BLOOD PRESSURE TEST KIT
1 KIT MISCELLANEOUS DAILY
Qty: 1 KIT | Refills: 0 | Status: SHIPPED | OUTPATIENT
Start: 2019-12-03

## 2019-12-03 RX ORDER — DOXAZOSIN MESYLATE 1 MG/1
1 TABLET ORAL DAILY
Qty: 30 TABLET | Refills: 3 | Status: SHIPPED | OUTPATIENT
Start: 2019-12-03 | End: 2020-04-14

## 2019-12-03 RX ORDER — AZELASTINE 1 MG/ML
1 SPRAY, METERED NASAL 2 TIMES DAILY
Qty: 1 BOTTLE | Refills: 0 | Status: SHIPPED | OUTPATIENT
Start: 2019-12-03

## 2019-12-04 LAB
ESTIMATED AVERAGE GLUCOSE: 116.9 MG/DL
HBA1C MFR BLD: 5.7 %

## 2019-12-16 ENCOUNTER — OFFICE VISIT (OUTPATIENT)
Dept: ENT CLINIC | Age: 59
End: 2019-12-16
Payer: COMMERCIAL

## 2019-12-16 VITALS
WEIGHT: 242 LBS | HEART RATE: 68 BPM | TEMPERATURE: 98 F | HEIGHT: 67 IN | BODY MASS INDEX: 37.98 KG/M2 | SYSTOLIC BLOOD PRESSURE: 127 MMHG | DIASTOLIC BLOOD PRESSURE: 74 MMHG

## 2019-12-16 DIAGNOSIS — H61.23 BILATERAL IMPACTED CERUMEN: ICD-10-CM

## 2019-12-16 DIAGNOSIS — J30.89 NON-SEASONAL ALLERGIC RHINITIS, UNSPECIFIED TRIGGER: Primary | ICD-10-CM

## 2019-12-16 DIAGNOSIS — H93.8X3 SENSATION OF FULLNESS IN BOTH EARS: ICD-10-CM

## 2019-12-16 PROCEDURE — 99213 OFFICE O/P EST LOW 20 MIN: CPT | Performed by: OTOLARYNGOLOGY

## 2019-12-16 PROCEDURE — 69210 REMOVE IMPACTED EAR WAX UNI: CPT | Performed by: OTOLARYNGOLOGY

## 2019-12-16 RX ORDER — BETAMETHASONE SODIUM PHOSPHATE AND BETAMETHASONE ACETATE 3; 3 MG/ML; MG/ML
6 INJECTION, SUSPENSION INTRA-ARTICULAR; INTRALESIONAL; INTRAMUSCULAR; SOFT TISSUE ONCE
Status: SHIPPED | OUTPATIENT
Start: 2019-12-16

## 2019-12-25 DIAGNOSIS — M54.50 LUMBAR SPINE PAIN: ICD-10-CM

## 2019-12-26 DIAGNOSIS — M54.50 LUMBAR SPINE PAIN: ICD-10-CM

## 2019-12-26 RX ORDER — CELECOXIB 200 MG/1
200 CAPSULE ORAL DAILY
Qty: 30 CAPSULE | Refills: 1 | Status: SHIPPED | OUTPATIENT
Start: 2019-12-26 | End: 2019-12-26

## 2019-12-26 RX ORDER — CELECOXIB 200 MG/1
200 CAPSULE ORAL DAILY
Qty: 90 CAPSULE | Refills: 0 | Status: SHIPPED | OUTPATIENT
Start: 2019-12-26 | End: 2020-03-24

## 2020-03-24 ENCOUNTER — VIRTUAL VISIT (OUTPATIENT)
Dept: INTERNAL MEDICINE CLINIC | Age: 60
End: 2020-03-24
Payer: COMMERCIAL

## 2020-03-24 DIAGNOSIS — R53.81 MALAISE AND FATIGUE: ICD-10-CM

## 2020-03-24 DIAGNOSIS — R53.83 MALAISE AND FATIGUE: ICD-10-CM

## 2020-03-24 DIAGNOSIS — R68.83 CHILLS: ICD-10-CM

## 2020-03-24 LAB
A/G RATIO: 1.8 (ref 1.1–2.2)
ALBUMIN SERPL-MCNC: 4.3 G/DL (ref 3.4–5)
ALP BLD-CCNC: 86 U/L (ref 40–129)
ALT SERPL-CCNC: 13 U/L (ref 10–40)
ANION GAP SERPL CALCULATED.3IONS-SCNC: 13 MMOL/L (ref 3–16)
AST SERPL-CCNC: 11 U/L (ref 15–37)
BASOPHILS ABSOLUTE: 0 K/UL (ref 0–0.2)
BASOPHILS RELATIVE PERCENT: 1.1 %
BILIRUB SERPL-MCNC: 0.4 MG/DL (ref 0–1)
BUN BLDV-MCNC: 18 MG/DL (ref 7–20)
CALCIUM SERPL-MCNC: 9.5 MG/DL (ref 8.3–10.6)
CHLORIDE BLD-SCNC: 105 MMOL/L (ref 99–110)
CO2: 24 MMOL/L (ref 21–32)
CREAT SERPL-MCNC: 1.4 MG/DL (ref 0.8–1.3)
EOSINOPHILS ABSOLUTE: 0.2 K/UL (ref 0–0.6)
EOSINOPHILS RELATIVE PERCENT: 3.8 %
FOLATE: >20 NG/ML (ref 4.78–24.2)
GFR AFRICAN AMERICAN: >60
GFR NON-AFRICAN AMERICAN: 51
GLOBULIN: 2.4 G/DL
GLUCOSE BLD-MCNC: 108 MG/DL (ref 70–99)
HCT VFR BLD CALC: 43.1 % (ref 40.5–52.5)
HEMOGLOBIN: 14.3 G/DL (ref 13.5–17.5)
LYMPHOCYTES ABSOLUTE: 1.6 K/UL (ref 1–5.1)
LYMPHOCYTES RELATIVE PERCENT: 35.4 %
MCH RBC QN AUTO: 30.5 PG (ref 26–34)
MCHC RBC AUTO-ENTMCNC: 33.3 G/DL (ref 31–36)
MCV RBC AUTO: 91.7 FL (ref 80–100)
MONOCYTES ABSOLUTE: 0.5 K/UL (ref 0–1.3)
MONOCYTES RELATIVE PERCENT: 11.1 %
NEUTROPHILS ABSOLUTE: 2.2 K/UL (ref 1.7–7.7)
NEUTROPHILS RELATIVE PERCENT: 48.6 %
PDW BLD-RTO: 13.8 % (ref 12.4–15.4)
PLATELET # BLD: 215 K/UL (ref 135–450)
PMV BLD AUTO: 9.6 FL (ref 5–10.5)
POTASSIUM SERPL-SCNC: 4.6 MMOL/L (ref 3.5–5.1)
RBC # BLD: 4.7 M/UL (ref 4.2–5.9)
SODIUM BLD-SCNC: 142 MMOL/L (ref 136–145)
T4 FREE: 1 NG/DL (ref 0.9–1.8)
TOTAL PROTEIN: 6.7 G/DL (ref 6.4–8.2)
TSH SERPL DL<=0.05 MIU/L-ACNC: 2.99 UIU/ML (ref 0.27–4.2)
VITAMIN B-12: 732 PG/ML (ref 211–911)
WBC # BLD: 4.5 K/UL (ref 4–11)

## 2020-03-24 PROCEDURE — 99213 OFFICE O/P EST LOW 20 MIN: CPT | Performed by: INTERNAL MEDICINE

## 2020-03-24 ASSESSMENT — ENCOUNTER SYMPTOMS: SHORTNESS OF BREATH: 0

## 2020-06-03 ENCOUNTER — VIRTUAL VISIT (OUTPATIENT)
Dept: INTERNAL MEDICINE CLINIC | Age: 60
End: 2020-06-03
Payer: COMMERCIAL

## 2020-06-03 VITALS — BODY MASS INDEX: 33.74 KG/M2 | WEIGHT: 215 LBS | TEMPERATURE: 96 F | HEIGHT: 67 IN

## 2020-06-03 DIAGNOSIS — E11.9 CONTROLLED TYPE 2 DIABETES MELLITUS WITHOUT COMPLICATION, WITHOUT LONG-TERM CURRENT USE OF INSULIN (HCC): ICD-10-CM

## 2020-06-03 DIAGNOSIS — E11.9 DIABETES MELLITUS WITH COINCIDENT HYPERTENSION (HCC): Chronic | ICD-10-CM

## 2020-06-03 DIAGNOSIS — I10 DIABETES MELLITUS WITH COINCIDENT HYPERTENSION (HCC): Chronic | ICD-10-CM

## 2020-06-03 LAB
CREATININE URINE: 179.2 MG/DL (ref 39–259)
MICROALBUMIN UR-MCNC: <1.2 MG/DL
MICROALBUMIN/CREAT UR-RTO: NORMAL MG/G (ref 0–30)

## 2020-06-03 PROCEDURE — 99213 OFFICE O/P EST LOW 20 MIN: CPT | Performed by: INTERNAL MEDICINE

## 2020-06-03 RX ORDER — SEMAGLUTIDE 1.34 MG/ML
INJECTION, SOLUTION SUBCUTANEOUS
COMMUNITY
Start: 2020-05-27

## 2020-06-03 RX ORDER — DOXAZOSIN 2 MG/1
2 TABLET ORAL DAILY
Qty: 30 TABLET | Refills: 5 | Status: SHIPPED | OUTPATIENT
Start: 2020-06-03 | End: 2020-06-03

## 2020-06-03 RX ORDER — LOSARTAN POTASSIUM 100 MG/1
50 TABLET ORAL DAILY
Qty: 30 TABLET | Refills: 5
Start: 2020-06-03 | End: 2020-10-02

## 2020-06-03 ASSESSMENT — PATIENT HEALTH QUESTIONNAIRE - PHQ9
SUM OF ALL RESPONSES TO PHQ9 QUESTIONS 1 & 2: 0
2. FEELING DOWN, DEPRESSED OR HOPELESS: 0
1. LITTLE INTEREST OR PLEASURE IN DOING THINGS: 0
SUM OF ALL RESPONSES TO PHQ QUESTIONS 1-9: 0
SUM OF ALL RESPONSES TO PHQ QUESTIONS 1-9: 0

## 2020-06-03 ASSESSMENT — ENCOUNTER SYMPTOMS
WHEEZING: 0
SHORTNESS OF BREATH: 0

## 2020-06-03 NOTE — PROGRESS NOTES
6/3/2020      TELEHEALTH EVALUATION -- Audio/Visual (During QBVTL-30 public health emergency)    HPI:    Cassandra Vicente (:  1960) has requested an audio/video evaluation for the following concern(s):    Cassandra Vicente returns for follow up of hypertension. Patient has been taking His medications as prescribed. Patient's blood pressure is  controlled. Side effects related to taking the medications include no medication side effects noted    Patient returns to the office for follow up of his diabetes. His last hemoglobin A1c was 5.7. He is compliant with his medications and diet. Patient has no symptoms related to her condition such as blurred vision, slurred speech, chest pain or shortness of breath. PHQ Scores 6/3/2020 2019 2018 2018   PHQ2 Score 0 0 0 0   PHQ9 Score 0 0 0 0     Interpretation of Total Score Depression Severity: 1-4 = Minimal depression, 5-9 = Mild depression, 10-14 = Moderate depression, 15-19 = Moderately severe depression, 20-27 = Severe depression    Review of Systems   Respiratory: Negative for shortness of breath and wheezing. Cardiovascular: Negative for chest pain. Prior to Visit Medications    Medication Sig Taking?  Authorizing Provider   doxazosin (CARDURA) 1 MG tablet TAKE 1 TABLET BY MOUTH DAILY  Chava Burks MD   losartan (COZAAR) 100 MG tablet TAKE 1 TABLET BY MOUTH DAILY  Chava Burks MD   amLODIPine (NORVASC) 5 MG tablet TAKE 1 TABLET BY MOUTH EVERY DAY  Chava Burks MD   celecoxib (CELEBREX) 200 MG capsule TAKE 1 CAPSULE BY MOUTH DAILY  Chava Burks MD   metFORMIN (GLUCOPHAGE) 1000 MG tablet TAKE 1 TABLET BY MOUTH TWICE DAILY WITH MEALS  Chava Burks MD   ONE TOUCH ULTRA TEST strip USE TO TEST ONCE DAILY AS NEEDED  Chava Burks MD   Dulaglutide (TRULICITY) 7.25 LH/3.3YF SOPN INJECT 0.75 MG INTO THE SKIN EVERY 7 DAYS  Chava Burks MD   atorvastatin (LIPITOR) 20 MG tablet TAKE 1 TABLET BY MOUTH DAILY  Chava Burks MD   Blood Pressure KIT 1 each by Does not apply route daily  Taj Wahl MD   azelastine (ASTELIN) 0.1 % nasal spray 1 spray by Nasal route 2 times daily Use in each nostril as directed  Taj Wahl MD   [employer] LANCETS 84R MISC USE AS DIRECTED THREE TIMES DAILY  Taj Wahl MD   Blood Glucose Monitoring Suppl (ONE TOUCH ULTRA 2) w/Device KIT Test 3 times a day & as needed for symptoms of irregular blood glucose. Taj Wahl MD   Cholecalciferol (VITAMIN D3) 2000 units CAPS Take 1 capsule by mouth daily  Historical Provider, MD   triamcinolone (KENALOG) 0.1 % cream Apply topically 2 times daily. Taj Wahl MD   aspirin 81 MG EC tablet Take 81 mg by mouth daily. Historical Provider, MD       Social History     Tobacco Use    Smoking status: Never Smoker    Smokeless tobacco: Never Used   Substance Use Topics    Alcohol use: No    Drug use: No          PHYSICAL EXAMINATION:  There were no vitals filed for this visit. Physical Exam  Vitals signs reviewed. Constitutional:       Appearance: Normal appearance. HENT:      Head: Normocephalic and atraumatic. Eyes:      Extraocular Movements: Extraocular movements intact. Conjunctiva/sclera: Conjunctivae normal.      Pupils: Pupils are equal, round, and reactive to light. Pulmonary:      Effort: Pulmonary effort is normal.   Neurological:      General: No focal deficit present. Mental Status: He is alert and oriented to person, place, and time. Cranial Nerves: No cranial nerve deficit. Psychiatric:         Mood and Affect: Mood normal.         Behavior: Behavior normal.         Thought Content: Thought content normal.         Judgment: Judgment normal.         ASSESSMENT/PLAN:  Assessment/Plan:  Lashae Mon was seen today for diabetes.     Diagnoses and all orders for this visit:    Diabetes mellitus with coincident hypertension ([facility]  -     Cancel: Hemoglobin A1C; Future  -     Hemoglobin A1C; Future  -     losartan (COZAAR) AM    An electronic signature was used to authenticate this note.

## 2020-06-04 LAB
ESTIMATED AVERAGE GLUCOSE: 119.8 MG/DL
HBA1C MFR BLD: 5.8 %

## 2020-07-23 LAB
CHOLESTEROL, TOTAL: 94 MG/DL
CHOLESTEROL/HDL RATIO: 1.8
HDLC SERPL-MCNC: 53 MG/DL (ref 35–70)
LDL CHOLESTEROL CALCULATED: 23 MG/DL (ref 0–160)
TRIGL SERPL-MCNC: 88 MG/DL
VLDLC SERPL CALC-MCNC: NORMAL MG/DL

## 2020-08-06 ENCOUNTER — TELEPHONE (OUTPATIENT)
Dept: INTERNAL MEDICINE CLINIC | Age: 60
End: 2020-08-06

## 2020-08-06 NOTE — TELEPHONE ENCOUNTER
----- Message from Zeus Vargas sent at 8/6/2020  1:42 PM EDT -----  Subject: Message to Provider    QUESTIONS  Information for Provider? Patient is calling in regards to medical records   that were released in July. He states they did not receive anything past   7/30/2018. His life insurance policy needs the past 5 years of medical   records. He would like a call back. ---------------------------------------------------------------------------  --------------  [place]  What is the best way for the office to contact you? OK to leave message on   voicemail  Preferred Call Back Phone Number? [phone]  ---------------------------------------------------------------------------  --------------  SCRIPT ANSWERS  Relationship to Patient?  Self

## 2020-08-07 NOTE — TELEPHONE ENCOUNTER
Called [facility] to see why records sent did not include 2019 to present. Per [facility], the initial request had records coming from [facility] only. A new records request was faxed to office today to include the [facility] office notes. I called pt and advised him of what had transpired and the request would be faxed to [facility] and expedited.

## 2020-09-17 ENCOUNTER — TELEPHONE (OUTPATIENT)
Dept: INTERNAL MEDICINE CLINIC | Age: 60
End: 2020-09-17

## 2021-07-27 DIAGNOSIS — E11.9 DIABETES MELLITUS WITH COINCIDENT HYPERTENSION (HCC): Chronic | ICD-10-CM

## 2021-07-27 DIAGNOSIS — I10 DIABETES MELLITUS WITH COINCIDENT HYPERTENSION (HCC): Chronic | ICD-10-CM

## 2021-07-28 DIAGNOSIS — I10 DIABETES MELLITUS WITH COINCIDENT HYPERTENSION (HCC): Chronic | ICD-10-CM

## 2021-07-28 DIAGNOSIS — E11.9 DIABETES MELLITUS WITH COINCIDENT HYPERTENSION (HCC): Chronic | ICD-10-CM

## 2021-07-28 RX ORDER — AMLODIPINE BESYLATE 5 MG/1
TABLET ORAL
Qty: 30 TABLET | Refills: 0 | Status: SHIPPED | OUTPATIENT
Start: 2021-07-28 | End: 2021-08-27

## 2021-07-28 RX ORDER — LOSARTAN POTASSIUM 100 MG/1
TABLET ORAL
Qty: 30 TABLET | Refills: 0 | OUTPATIENT
Start: 2021-07-28

## 2021-07-28 RX ORDER — LOSARTAN POTASSIUM 100 MG/1
TABLET ORAL
Qty: 30 TABLET | Refills: 0 | Status: SHIPPED | OUTPATIENT
Start: 2021-07-28 | End: 2021-08-27

## 2021-07-28 RX ORDER — AMLODIPINE BESYLATE 5 MG/1
TABLET ORAL
Qty: 30 TABLET | Refills: 0 | OUTPATIENT
Start: 2021-07-28

## 2021-08-27 DIAGNOSIS — E11.9 DIABETES MELLITUS WITH COINCIDENT HYPERTENSION (HCC): Chronic | ICD-10-CM

## 2021-08-27 DIAGNOSIS — I10 DIABETES MELLITUS WITH COINCIDENT HYPERTENSION (HCC): Chronic | ICD-10-CM

## 2021-08-27 RX ORDER — AMLODIPINE BESYLATE 5 MG/1
TABLET ORAL
Qty: 30 TABLET | Refills: 0 | Status: SHIPPED | OUTPATIENT
Start: 2021-08-27

## 2021-08-27 RX ORDER — LOSARTAN POTASSIUM 100 MG/1
TABLET ORAL
Qty: 30 TABLET | Refills: 0 | Status: SHIPPED | OUTPATIENT
Start: 2021-08-27

## 2025-01-22 NOTE — PATIENT INSTRUCTIONS
may talk with you about surgery. Weight-loss surgery has risks, and you will need to work with your doctor to compare the risk of having obesity with the risks of surgery. With any option you choose, you will still need to eat a healthy diet and get regular exercise. Follow-up care is a key part of your treatment and safety. Be sure to make and go to all appointments, and call your doctor if you are having problems. It's also a good idea to know your test results and keep a list of the medicines you take. Where can you learn more? Go to https://[employer]. org and sign in to your [employer] account. Enter N111 in the [employer] box to learn more about \"Learning About Obesity. \"     If you do not have an account, please click on the \"Sign Up Now\" link. Current as of: October 9, 2017  Content Version: 11.7  © 0041-8986 [employer]. Care instructions adapted under license by [facility] ([facility]). If you have questions about a medical condition or this instruction, always ask your healthcare professional. [address] any warranty or liability for your use of this information.
acute illness

## (undated) DEVICE — CHLORAPREP 26ML ORANGE

## (undated) DEVICE — BANDAGE COMPR W2INXL5YD TAN BRTH SELF ADH WRP W/ HND TEAR

## (undated) DEVICE — SOLUTION IV IRRIG 250ML ST LF 0.9% SODIUM 2F7122

## (undated) DEVICE — ZIMMER® STERILE DISPOSABLE TOURNIQUET CUFF WITH PLC, DUAL PORT, SINGLE BLADDER, 18 IN. (46 CM)

## (undated) DEVICE — SPONGE GZ W4XL4IN COT 12 PLY TYP VII WVN C FLD DSGN

## (undated) DEVICE — GLOVE SURG SZ 65 L12IN FNGR THK87MIL WHT LTX FREE

## (undated) DEVICE — MINOR SET UP PK

## (undated) DEVICE — GLOVE SURG SZ 8 L12IN FNGR THK94MIL STD WHT LTX SYN POLYMER

## (undated) DEVICE — NEEDLE HYPO 18GA L1.5IN THN WALL PIVOTING SHLD BVL ORIENTED

## (undated) DEVICE — SHEET,DRAPE,53X77,STERILE: Brand: MEDLINE

## (undated) DEVICE — GOWN,SIRUS,POLYRNF,BRTHSLV,XL,30/CS: Brand: MEDLINE

## (undated) DEVICE — GOWN SIRUS NONREIN XL W/TWL: Brand: MEDLINE INDUSTRIES, INC.

## (undated) DEVICE — COVER LT HNDL BLU PLAS

## (undated) DEVICE — UNDERGLOVE SURG SZ 8 FNGR THK0.21MIL GRN LTX BEAD CUF

## (undated) DEVICE — NEEDLE HYPO 25GA L1.5IN BVL ORIENTED ECLIPSE

## (undated) DEVICE — DRESSING PETRO W3XL3IN OIL EMUL N ADH GZ KNIT IMPREG CELOS

## (undated) DEVICE — SYRINGE MED 10ML LUERLOCK TIP W/O SFTY DISP

## (undated) DEVICE — DRAPE HND W114XL142IN BLU POLYPR W O PCH FEN CRD AND TB HLDR

## (undated) DEVICE — BANDAGE COMPR W4INXL12FT E DISP ESMARCH EVEN

## (undated) DEVICE — Z DISCONTINUED USE 2275676 GLOVE SURG SZ 65 L12IN FNGR THK87MIL DK GRN LTX FREE ISOLEX